# Patient Record
Sex: MALE | Race: WHITE | NOT HISPANIC OR LATINO | Employment: OTHER | URBAN - METROPOLITAN AREA
[De-identification: names, ages, dates, MRNs, and addresses within clinical notes are randomized per-mention and may not be internally consistent; named-entity substitution may affect disease eponyms.]

---

## 2018-03-23 DIAGNOSIS — N18.30 CHRONIC KIDNEY DISEASE, STAGE 3 (CMS/HCC): Primary | ICD-10-CM

## 2018-08-24 ENCOUNTER — OFFICE VISIT (OUTPATIENT)
Dept: NEPHROLOGY | Facility: CLINIC | Age: 73
End: 2018-08-24
Payer: MEDICARE

## 2018-08-24 VITALS
DIASTOLIC BLOOD PRESSURE: 56 MMHG | HEART RATE: 84 BPM | SYSTOLIC BLOOD PRESSURE: 122 MMHG | WEIGHT: 315 LBS | HEIGHT: 72 IN | BODY MASS INDEX: 42.66 KG/M2

## 2018-08-24 DIAGNOSIS — E11.9 CONTROLLED TYPE 2 DIABETES MELLITUS WITHOUT COMPLICATION, WITH LONG-TERM CURRENT USE OF INSULIN (CMS/HCC): ICD-10-CM

## 2018-08-24 DIAGNOSIS — Z79.4 CONTROLLED TYPE 2 DIABETES MELLITUS WITHOUT COMPLICATION, WITH LONG-TERM CURRENT USE OF INSULIN (CMS/HCC): ICD-10-CM

## 2018-08-24 DIAGNOSIS — N18.32 CKD STAGE G3B/A1, GFR 30-44 AND ALBUMIN CREATININE RATIO <30 MG/G (CMS/HCC): Primary | ICD-10-CM

## 2018-08-24 DIAGNOSIS — I10 ESSENTIAL HYPERTENSION: ICD-10-CM

## 2018-08-24 PROBLEM — G62.9 NEUROPATHY: Status: ACTIVE | Noted: 2018-08-24

## 2018-08-24 PROBLEM — N18.31 CKD STAGE G3A/A1, GFR 45-59 AND ALBUMIN CREATININE RATIO <30 MG/G (CMS/HCC): Status: ACTIVE | Noted: 2018-08-24

## 2018-08-24 PROBLEM — Z72.0 TOBACCO USER: Status: RESOLVED | Noted: 2018-08-24 | Resolved: 2018-08-24

## 2018-08-24 PROBLEM — Z72.0 TOBACCO USER: Status: ACTIVE | Noted: 2018-08-24

## 2018-08-24 PROBLEM — R80.9 PROTEINURIA: Status: ACTIVE | Noted: 2018-08-24

## 2018-08-24 PROBLEM — Z90.49 STATUS POST LEFT HEMICOLECTOMY: Status: ACTIVE | Noted: 2018-08-24

## 2018-08-24 PROBLEM — N18.2 CHRONIC KIDNEY DISEASE, STAGE II (MILD): Status: ACTIVE | Noted: 2018-08-24

## 2018-08-24 PROBLEM — T78.3XXA ANGIOEDEMA: Status: RESOLVED | Noted: 2018-08-24 | Resolved: 2018-08-24

## 2018-08-24 PROBLEM — E66.01 SEVERE OBESITY (BMI >= 40) (CMS/HCC): Status: ACTIVE | Noted: 2018-08-24

## 2018-08-24 PROBLEM — E78.5 HYPERLIPIDEMIA: Status: ACTIVE | Noted: 2018-08-24

## 2018-08-24 PROBLEM — T78.3XXA ANGIOEDEMA: Status: ACTIVE | Noted: 2018-08-24

## 2018-08-24 PROBLEM — G47.33 OBSTRUCTIVE SLEEP APNEA TREATED WITH CONTINUOUS POSITIVE AIRWAY PRESSURE (CPAP): Status: ACTIVE | Noted: 2018-08-24

## 2018-08-24 PROBLEM — Z90.49 STATUS POST LEFT HEMICOLECTOMY: Status: RESOLVED | Noted: 2018-08-24 | Resolved: 2018-08-24

## 2018-08-24 PROBLEM — J44.9 CHRONIC OBSTRUCTIVE LUNG DISEASE (CMS/HCC): Status: ACTIVE | Noted: 2018-08-24

## 2018-08-24 PROBLEM — K31.7 GASTRIC POLYP: Status: ACTIVE | Noted: 2018-08-24

## 2018-08-24 PROCEDURE — 99214 OFFICE O/P EST MOD 30 MIN: CPT | Performed by: INTERNAL MEDICINE

## 2018-08-24 PROCEDURE — 99214 OFFICE O/P EST MOD 30 MIN: CPT | Mod: PO | Performed by: INTERNAL MEDICINE

## 2018-08-24 RX ORDER — VALSARTAN 80 MG/1
80 TABLET ORAL 2 TIMES DAILY
COMMUNITY

## 2018-08-24 RX ORDER — MULTIVITAMIN
1 TABLET ORAL DAILY
COMMUNITY
End: 2019-08-02 | Stop reason: SDUPTHER

## 2018-08-24 RX ORDER — FUROSEMIDE 40 MG/1
20 TABLET ORAL 2 TIMES DAILY
COMMUNITY

## 2018-08-24 RX ORDER — FENOFIBRATE 145 MG/1
1 TABLET ORAL DAILY
COMMUNITY
Start: 2018-07-12

## 2018-08-24 RX ORDER — METOPROLOL SUCCINATE 100 MG/1
100 TABLET, EXTENDED RELEASE ORAL DAILY
COMMUNITY

## 2018-08-24 RX ORDER — PIOGLITAZONEHYDROCHLORIDE 15 MG/1
15 TABLET ORAL DAILY
COMMUNITY
Start: 2018-07-12 | End: 2019-08-23

## 2018-08-24 RX ORDER — GABAPENTIN 300 MG/1
300 CAPSULE ORAL 2 TIMES DAILY
COMMUNITY

## 2018-08-24 RX ORDER — EZETIMIBE 10 MG/1
10 TABLET ORAL DAILY
COMMUNITY
End: 2021-06-22 | Stop reason: ALTCHOICE

## 2018-08-24 RX ORDER — AMLODIPINE BESYLATE 10 MG/1
10 TABLET ORAL DAILY
COMMUNITY

## 2018-08-24 RX ORDER — PRAVASTATIN SODIUM 40 MG/1
40 TABLET ORAL DAILY
COMMUNITY
End: 2021-06-22 | Stop reason: ALTCHOICE

## 2018-08-24 RX ORDER — SPIRONOLACTONE 25 MG/1
25 TABLET ORAL 2 TIMES DAILY
COMMUNITY

## 2018-08-24 RX ORDER — PHENOL/SODIUM PHENOLATE
20 AEROSOL, SPRAY (ML) MUCOUS MEMBRANE EVERY EVENING
COMMUNITY
End: 2021-06-22 | Stop reason: SDUPTHER

## 2018-08-24 ASSESSMENT — ENCOUNTER SYMPTOMS
ABDOMINAL PAIN: 0
BACK PAIN: 0
PALPITATIONS: 0
SORE THROAT: 0
EYE PAIN: 0
HEMATURIA: 0
SHORTNESS OF BREATH: 1
CHILLS: 0
VOMITING: 0
COUGH: 0
FEVER: 0
SEIZURES: 0
ARTHRALGIAS: 0
DYSURIA: 0
COLOR CHANGE: 0

## 2018-08-24 ASSESSMENT — PAIN SCALES - GENERAL: PAINLEVEL: 0-NO PAIN

## 2018-08-24 NOTE — LETTER
[facility] CLINIC NEPHROLOGY  [address] [place] [address]  [phone]  Dept: [phone]  August 24, 2018     [facility] [place]  [address]  [place] [address]    Patient: Trenton Pulliam   YOB: 1945   Date of Visit: 8/24/2018       To:  [facility] [place]    Our mutual patient, Trenton Pulliam, was seen in my office on 8/24/2018. Below are my notes.     SANDHYA SANTOS MD  8/24/2018  2:11 PM  Signed  Subjective      Patient ID: Trenton Pulliam is a 73 y.o. male.    HPI  Patient is seen in clinic today for follow-up of his stage III chronic kidney disease with underlying history of diabetes mellitus and hypertension.  He had a remote history of acute kidney injury was necessitated dialysis.  He has been doing reasonably well he denies chest pain has some chronic dyspnea on exertion but no orthopnea or PND he is now on CPAP for obstructive sleep apnea.  He denies any significant lower extremity edema.  He denies any urinary symptoms.  His hemoglobin A1c has improved declined down to 6.9.  He denies any trouble related to his medications.  There has been a slow decline in renal function over the past several years.  There is no nonsteroidal use.  He offers no other complaints.  Past medical history, surgical history, and medications were reviewed in detail with the patient this visit.    Current Outpatient Prescriptions:   •  amLODIPine (NORVASC) 10 mg tablet, Take 10 mg by mouth daily., Disp: , Rfl:   •  aspirin 81 mg EC tablet, Take 81 mg by mouth daily.  , Disp: , Rfl:   •  blood sugar diagnostic (PRECISION XTRA TEST) strip, Use to test blood glucose three times daily, Disp: 300, Rfl: 1  •  empagliflozin (JARDIANCE) 25 mg tablet, Take 25 mg by mouth daily., Disp: , Rfl:   •  empagliflozin 25 mg tablet, Take 1 tablet (25 mg total) by mouth daily., Disp: 90 tablet, Rfl: 3  •  ezetimibe (ZETIA) 10 mg tablet, Take 10 mg by mouth daily., Disp: , Rfl:   •  furosemide (LASIX) 40 mg tablet, Take  20 mg by mouth 2 (two) times a day. , Disp: , Rfl:   •  gabapentin (NEURONTIN) 300 mg capsule, Take 300 mg by mouth 2 (two) times a day., Disp: , Rfl:   •  metoprolol succinate XL (TOPROL-XL) 100 mg 24 hr tablet, Take 100 mg by mouth daily., Disp: , Rfl:   •  multivitamin (THERAGRAN) tablet tablet, Take 1 tablet by mouth daily., Disp: , Rfl:   •  omeprazole (PriLOSEC) 20 mg tablet,delayed release (DR/EC), Take 20 mg by mouth every evening., Disp: , Rfl:   •  pioglitazone (ACTOS) 15 mg tablet, Take 15 mg by mouth daily., Disp: , Rfl:   •  pravastatin (PRAVACHOL) 40 mg tablet, Take 40 mg by mouth daily., Disp: , Rfl:   •  ranitidine (ZANTAC) 150 mg tablet, Take 150 mg by mouth 2 (two) times a day., Disp: , Rfl:   •  sitaGLIPtin-metformin (JANUMET) 50-1,000 mg per tablet, Take 1 tablet by mouth daily., Disp: , Rfl:   •  spironolactone (ALDACTONE) 25 mg tablet, Take 25 mg by mouth 2 (two) times a day., Disp: , Rfl:   •  TRICOR 145 mg tablet, Take 1 tablet by mouth daily., Disp: , Rfl:   •  valsartan (DIOVAN) 80 mg tablet, Take 80 mg by mouth 2 (two) times a day., Disp: , Rfl:   •  empagliflozin (JARDIANCE) 25 mg tablet, TAKE 1 TABLET DAILY, Disp: 90, Rfl: 0  •  gabapentin (NEURONTIN) 300 mg capsule, , Disp: 180, Rfl: 0        Review of Systems   Constitutional: Negative for chills and fever.   HENT: Negative for ear pain and sore throat.    Eyes: Negative for pain and visual disturbance.   Respiratory: Positive for shortness of breath. Negative for cough.    Cardiovascular: Negative for chest pain and palpitations.   Gastrointestinal: Negative for abdominal pain and vomiting.   Genitourinary: Negative for dysuria and hematuria.   Musculoskeletal: Negative for arthralgias and back pain.   Skin: Negative for color change and rash.   Neurological: Negative for seizures and syncope.   All other systems reviewed and are negative.      Objective     Physical Exam   Constitutional: He is oriented to person, place, and time. He  appears well-developed and well-nourished. No distress.   110/64 right arm sitting   Neck: Neck supple. No JVD present.   Cardiovascular: Normal rate, regular rhythm and normal heart sounds.    Pulmonary/Chest: Effort normal and breath sounds normal. No respiratory distress.   Musculoskeletal: Normal range of motion. He exhibits no edema.   Neurological: He is alert and oriented to person, place, and time.   Skin: Skin is warm and dry.   Vitals reviewed.    Abstract on 08/21/2018   Component Date Value Ref Range Status   • External Hemoglobin A1C 07/12/2018 6.9  % Final   • PSA 07/12/2018 2.06  ng/mL Final   • External TSH 07/12/2018 1.14  uIU/ml Final   • Color 07/12/2018 Yellow  Yellow - Yellow Final   • Clarity 07/12/2018 Clear  Clear - Clear Final   • Glucose 07/12/2018   Negative - Negative Final   • Bilirubin 07/12/2018 Negative  Negative - Negative Final   • Ketone 07/12/2018 Negative  Negative - Negative Final   • Specific gravity 07/12/2018 1.013  1.003 - 1.030 Final   • Blood 07/12/2018 Negative  Negative - Negative Final   • pH 07/12/2018 5.0  5.0 - 8.0 PH Final   • Protein 07/12/2018 Negative  Negative - Negative Final   • Urobilinogen 07/12/2018   2.0 EU/dl Final   • Nitrite 07/12/2018 Negative  Negative - Negative Final   • Leukocyte esterase 07/12/2018 Trace  Negative - Negative Final   • WBC Clumps, Urine 07/12/2018   None seen /HPF Final   • RBC Casts, Urine 07/12/2018   None seen /LPF Final   • URINE BACTERIA 07/12/2018 none   Final   • URINE SQUAMOUS EPITHELIAL CELL 07/12/2018 none   Final   Orders Only on 08/21/2018   Component Date Value Ref Range Status   • Triglycerides 07/12/2018 217  mg/dL Final   • Cholesterol 07/12/2018 131  mg/dL Final   • HDL 07/12/2018 39  mg/dL Final   • LDL Calculated 07/12/2018 48.6  mg/dL Final   • AST 07/12/2018 23  U/L Final   • ALT (SGPT) 07/12/2018 34  U/L Final   Orders Only on 08/21/2018   Component Date Value Ref Range Status   • Magnesium 07/12/2018 2.5   mg/dL Final   • Creatinine, Ur 07/12/2018 63  mg/dL Final   • Albumin, Urine 07/12/2018 0.7  mg/L Final   • ALBUMIN/CREATININE RATIO 07/12/2018 11.1   Final   • Albumin 07/12/2018 4.4  g/dL Final   • Calcium 07/12/2018 9.7  mg/dL Final   • Phosphorus 07/12/2018 4.2  mg/dL Final   • Glucose 07/12/2018 158  mg/dL Final   • BUN 07/12/2018 30  mg/dL Final   • Creatinine 07/12/2018 2.14  mg/dL Final   • Sodium 07/12/2018 145  mmol/L Final   • Potassium 07/12/2018 4.5  mmol/L Final   • Chloride 07/12/2018 103  mmol/L Final   • CO2 07/12/2018 28  mmol/L Final   • eGFR 07/12/2018 30.5  mL/min/1.73m*2 Final   • External WBC 07/12/2018 8.5  10*3/mL Final   • External RBC 07/12/2018 5.06  10*6/µL Final   • External Hemoglobin 07/12/2018 14.6  g/dL Final   • External Hematocrit 07/12/2018 45.2  % Final   • External MCV 07/12/2018 89.3  fL Final   • External MCH 07/12/2018 28.9  pg Final   • External MCHC 07/12/2018 32.3  g/dL Final   • EXTERNAL RDW-CV 07/12/2018 14.0  % Final   • External Platelets 07/12/2018 264  10*3/uL Final   • External MPV 07/12/2018 9.7  fL Final   • External Monocytes Absolute 07/12/2018 0.7  10*3/uL Final   • External Eosinophils Absolute 07/12/2018 0.5  10*3/uL Final   • External Basophils Absolute 07/12/2018 0.1  10*3/uL Final   • External Neutrophils% 07/12/2018 61.6  % Final   • External Eosinophils% 07/12/2018 5.7  % Final   • External Basophils% 07/12/2018 1.1  % Final   • Lymphocytes% 07/12/2018 22.9  % Final   • Monocytes% 07/12/2018 8.1  % Final   • Neutrophils Absolute 07/12/2018 5.2   Final   • External Lymphocytes Absolute 07/12/2018 1.9  10*3/uL Final   • PTH, Intact 07/12/2018 42.9  pg/mL Final         Assessment/Plan       Diagnosis Plan   1. CKD stage G3b/A1, GFR 30-44 and albumin creatinine ratio <30 mg/g     2. Controlled type 2 diabetes mellitus without complication, with long-term current use of insulin (CMS/[facility])     3. Essential hypertension         There is been a slow decline in  renal function over the past several years.  This can be seen as residual from acute kidney injury.  Blood pressures under adequate control.  Volume status is left and electrolytes are acceptable.  If the kidney function continues to deteriorate he may need to discontinue his Janumet in the near future.  No other adjustments are needed his medications.  24 hour urine collection is pending.               If you have questions, please do not hesitate to call me. I look forward to following your patient along with you.         Sincerely,        SANDHYA SANTOS MD        CC: No Recipients

## 2018-08-24 NOTE — PROGRESS NOTES
Subjective      Patient ID: Trenton Pulliam is a 73 y.o. male.    HPI  Patient is seen in clinic today for follow-up of his stage III chronic kidney disease with underlying history of diabetes mellitus and hypertension.  He had a remote history of acute kidney injury was necessitated dialysis.  He has been doing reasonably well he denies chest pain has some chronic dyspnea on exertion but no orthopnea or PND he is now on CPAP for obstructive sleep apnea.  He denies any significant lower extremity edema.  He denies any urinary symptoms.  His hemoglobin A1c has improved declined down to 6.9.  He denies any trouble related to his medications.  There has been a slow decline in renal function over the past several years.  There is no nonsteroidal use.  He offers no other complaints.  Past medical history, surgical history, and medications were reviewed in detail with the patient this visit.    Current Outpatient Prescriptions:   •  amLODIPine (NORVASC) 10 mg tablet, Take 10 mg by mouth daily., Disp: , Rfl:   •  aspirin 81 mg EC tablet, Take 81 mg by mouth daily.  , Disp: , Rfl:   •  blood sugar diagnostic (PRECISION XTRA TEST) strip, Use to test blood glucose three times daily, Disp: 300, Rfl: 1  •  empagliflozin (JARDIANCE) 25 mg tablet, Take 25 mg by mouth daily., Disp: , Rfl:   •  empagliflozin 25 mg tablet, Take 1 tablet (25 mg total) by mouth daily., Disp: 90 tablet, Rfl: 3  •  ezetimibe (ZETIA) 10 mg tablet, Take 10 mg by mouth daily., Disp: , Rfl:   •  furosemide (LASIX) 40 mg tablet, Take 20 mg by mouth 2 (two) times a day. , Disp: , Rfl:   •  gabapentin (NEURONTIN) 300 mg capsule, Take 300 mg by mouth 2 (two) times a day., Disp: , Rfl:   •  metoprolol succinate XL (TOPROL-XL) 100 mg 24 hr tablet, Take 100 mg by mouth daily., Disp: , Rfl:   •  multivitamin (THERAGRAN) tablet tablet, Take 1 tablet by mouth daily., Disp: , Rfl:   •  omeprazole (PriLOSEC) 20 mg tablet,delayed release (DR/EC), Take 20 mg by mouth every  evening., Disp: , Rfl:   •  pioglitazone (ACTOS) 15 mg tablet, Take 15 mg by mouth daily., Disp: , Rfl:   •  pravastatin (PRAVACHOL) 40 mg tablet, Take 40 mg by mouth daily., Disp: , Rfl:   •  ranitidine (ZANTAC) 150 mg tablet, Take 150 mg by mouth 2 (two) times a day., Disp: , Rfl:   •  sitaGLIPtin-metformin (JANUMET) 50-1,000 mg per tablet, Take 1 tablet by mouth daily., Disp: , Rfl:   •  spironolactone (ALDACTONE) 25 mg tablet, Take 25 mg by mouth 2 (two) times a day., Disp: , Rfl:   •  TRICOR 145 mg tablet, Take 1 tablet by mouth daily., Disp: , Rfl:   •  valsartan (DIOVAN) 80 mg tablet, Take 80 mg by mouth 2 (two) times a day., Disp: , Rfl:   •  empagliflozin (JARDIANCE) 25 mg tablet, TAKE 1 TABLET DAILY, Disp: 90, Rfl: 0  •  gabapentin (NEURONTIN) 300 mg capsule, , Disp: 180, Rfl: 0        Review of Systems   Constitutional: Negative for chills and fever.   HENT: Negative for ear pain and sore throat.    Eyes: Negative for pain and visual disturbance.   Respiratory: Positive for shortness of breath. Negative for cough.    Cardiovascular: Negative for chest pain and palpitations.   Gastrointestinal: Negative for abdominal pain and vomiting.   Genitourinary: Negative for dysuria and hematuria.   Musculoskeletal: Negative for arthralgias and back pain.   Skin: Negative for color change and rash.   Neurological: Negative for seizures and syncope.   All other systems reviewed and are negative.      Objective     Physical Exam   Constitutional: He is oriented to person, place, and time. He appears well-developed and well-nourished. No distress.   110/64 right arm sitting   Neck: Neck supple. No JVD present.   Cardiovascular: Normal rate, regular rhythm and normal heart sounds.    Pulmonary/Chest: Effort normal and breath sounds normal. No respiratory distress.   Musculoskeletal: Normal range of motion. He exhibits no edema.   Neurological: He is alert and oriented to person, place, and time.   Skin: Skin is warm and  dry.   Vitals reviewed.    Abstract on 08/21/2018   Component Date Value Ref Range Status   • External Hemoglobin A1C 07/12/2018 6.9  % Final   • PSA 07/12/2018 2.06  ng/mL Final   • External TSH 07/12/2018 1.14  uIU/ml Final   • Color 07/12/2018 Yellow  Yellow - Yellow Final   • Clarity 07/12/2018 Clear  Clear - Clear Final   • Glucose 07/12/2018   Negative - Negative Final   • Bilirubin 07/12/2018 Negative  Negative - Negative Final   • Ketone 07/12/2018 Negative  Negative - Negative Final   • Specific gravity 07/12/2018 1.013  1.003 - 1.030 Final   • Blood 07/12/2018 Negative  Negative - Negative Final   • pH 07/12/2018 5.0  5.0 - 8.0 PH Final   • Protein 07/12/2018 Negative  Negative - Negative Final   • Urobilinogen 07/12/2018   2.0 EU/dl Final   • Nitrite 07/12/2018 Negative  Negative - Negative Final   • Leukocyte esterase 07/12/2018 Trace  Negative - Negative Final   • WBC Clumps, Urine 07/12/2018   None seen /HPF Final   • RBC Casts, Urine 07/12/2018   None seen /LPF Final   • URINE BACTERIA 07/12/2018 none   Final   • URINE SQUAMOUS EPITHELIAL CELL 07/12/2018 none   Final   Orders Only on 08/21/2018   Component Date Value Ref Range Status   • Triglycerides 07/12/2018 217  mg/dL Final   • Cholesterol 07/12/2018 131  mg/dL Final   • HDL 07/12/2018 39  mg/dL Final   • LDL Calculated 07/12/2018 48.6  mg/dL Final   • AST 07/12/2018 23  U/L Final   • ALT (SGPT) 07/12/2018 34  U/L Final   Orders Only on 08/21/2018   Component Date Value Ref Range Status   • Magnesium 07/12/2018 2.5  mg/dL Final   • Creatinine, Ur 07/12/2018 63  mg/dL Final   • Albumin, Urine 07/12/2018 0.7  mg/L Final   • ALBUMIN/CREATININE RATIO 07/12/2018 11.1   Final   • Albumin 07/12/2018 4.4  g/dL Final   • Calcium 07/12/2018 9.7  mg/dL Final   • Phosphorus 07/12/2018 4.2  mg/dL Final   • Glucose 07/12/2018 158  mg/dL Final   • BUN 07/12/2018 30  mg/dL Final   • Creatinine 07/12/2018 2.14  mg/dL Final   • Sodium 07/12/2018 145  mmol/L Final    • Potassium 07/12/2018 4.5  mmol/L Final   • Chloride 07/12/2018 103  mmol/L Final   • CO2 07/12/2018 28  mmol/L Final   • eGFR 07/12/2018 30.5  mL/min/1.73m*2 Final   • External WBC 07/12/2018 8.5  10*3/mL Final   • External RBC 07/12/2018 5.06  10*6/µL Final   • External Hemoglobin 07/12/2018 14.6  g/dL Final   • External Hematocrit 07/12/2018 45.2  % Final   • External MCV 07/12/2018 89.3  fL Final   • External MCH 07/12/2018 28.9  pg Final   • External MCHC 07/12/2018 32.3  g/dL Final   • EXTERNAL RDW-CV 07/12/2018 14.0  % Final   • External Platelets 07/12/2018 264  10*3/uL Final   • External MPV 07/12/2018 9.7  fL Final   • External Monocytes Absolute 07/12/2018 0.7  10*3/uL Final   • External Eosinophils Absolute 07/12/2018 0.5  10*3/uL Final   • External Basophils Absolute 07/12/2018 0.1  10*3/uL Final   • External Neutrophils% 07/12/2018 61.6  % Final   • External Eosinophils% 07/12/2018 5.7  % Final   • External Basophils% 07/12/2018 1.1  % Final   • Lymphocytes% 07/12/2018 22.9  % Final   • Monocytes% 07/12/2018 8.1  % Final   • Neutrophils Absolute 07/12/2018 5.2   Final   • External Lymphocytes Absolute 07/12/2018 1.9  10*3/uL Final   • PTH, Intact 07/12/2018 42.9  pg/mL Final         Assessment/Plan       Diagnosis Plan   1. CKD stage G3b/A1, GFR 30-44 and albumin creatinine ratio <30 mg/g     2. Controlled type 2 diabetes mellitus without complication, with long-term current use of insulin (CMS/[facility])     3. Essential hypertension         There is been a slow decline in renal function over the past several years.  This can be seen as residual from acute kidney injury.  Blood pressures under adequate control.  Volume status is left and electrolytes are acceptable.  If the kidney function continues to deteriorate he may need to discontinue his Janumet in the near future.  No other adjustments are needed his medications.  24 hour urine collection is pending.

## 2018-08-27 NOTE — PROGRESS NOTES
PROGRESS NOTE     Subjective   Trenton Pulliam is a 73 y.o. male with type 2 diabetes, which he has had for about 20 years. His past medical history is also significant for hyperlipidemia, hypertension & CKD stage 3b, he uses coap and oxygen at night. A1c  Is 6.9%Currently taking Janumet 50/1000mg twice daily, Jardiance 25mg daily & Actos 15mg daily for diabetes management. He is trying any injectable meds.     Trenton checks blood sugars 1 times daily. Fasting readings 90s-140's in am. Denies any hypoglycemia since his last visit, stating the lowest his blood sugar gets is in the 90. [facility] . [facility]. He is limited by exercise - he says he get some sob and pain in his hip with pain.     Labs from 7/12/2018 are as follows:  HbA1c 6.9%, Urine albumin/creatinine ratio 11.2, PTH 42.9, BUN 30, creatinine 2.14, GFR 30.5,, , HDL 39 and LDL 48.6 - pravachol 40 mg and zetia 10 mg.     BP is 98/64 -early on amlodipine 10 mg, Spironactone 25 mg, valsartan 80 mg, Toprol 100 mg of note is on jardiance 25 mg. He is on lasix 40 mg.  No lightheadness no dizziness typically - very rarely will get lightheaded.     Currently Taking Medications:  Outpatient Prescriptions Marked as Taking for the 8/28/18 encounter (Office Visit) with Leah Venegas MD   Medication Indication(s) Sig   • amLODIPine (NORVASC) 10 mg tablet   Take 10 mg by mouth daily.   • aspirin 81 mg EC tablet   Take 81 mg by mouth daily.     • blood sugar diagnostic (PRECISION XTRA TEST) strip   Use to test blood glucose three times daily   • diphenhydramine HCl (SIMPLY SLEEP ORAL)   Take by mouth daily.   • empagliflozin (JARDIANCE) 25 mg tablet   TAKE 1 TABLET DAILY   • ezetimibe (ZETIA) 10 mg tablet   Take 10 mg by mouth daily.   • furosemide (LASIX) 40 mg tablet   Take 20 mg by mouth 2 (two) times a day.    • gabapentin (NEURONTIN) 300 mg capsule   Take 300 mg  by mouth 2 (two) times a day.   • Lactobacillus rhamnosus GG (CULTURELLE) 15 billion cell capsule, sprinkle   Take by mouth daily.   • metoprolol succinate XL (TOPROL-XL) 100 mg 24 hr tablet   Take 100 mg by mouth daily.   • multivitamin (THERAGRAN) tablet tablet   Take 1 tablet by mouth daily.   • omega 3-dha-epa-fish oil 250-500-1,000 mg capsule   Take by mouth 2 (two) times a day.   • omeprazole (PriLOSEC) 20 mg tablet,delayed release (DR/EC)   Take 20 mg by mouth every evening.   • pioglitazone (ACTOS) 15 mg tablet   Take 15 mg by mouth daily.   • pravastatin (PRAVACHOL) 40 mg tablet   Take 40 mg by mouth daily.   • psyllium husk (METAMUCIL ORAL)   Take by mouth daily.   • ranitidine (ZANTAC) 150 mg tablet   Take 150 mg by mouth 2 (two) times a day.   • sitaGLIPtin-metformin (JANUMET) 50-1,000 mg per tablet   Take 1 tablet by mouth daily.   • spironolactone (ALDACTONE) 25 mg tablet   Take 25 mg by mouth 2 (two) times a day.   • TRICOR 145 mg tablet   Take 1 tablet by mouth daily.   • valsartan (DIOVAN) 80 mg tablet   Take 80 mg by mouth 2 (two) times a day.   • [DISCONTINUED] empagliflozin 25 mg tablet   Take 1 tablet (25 mg total) by mouth daily.       Allergies:  Allergies   Allergen Reactions   • Ace Inhibitors      POSSIBLE ANGIOEDEMA   • Azithromycin Anaphylaxis     QUESTIONABLE ALLERGY   • Lisinopril      Other reaction(s): Cough  ANGIOEDEMA   • Nitroglycerin Anaphylaxis     SEIZURES       History Review:  No past medical history on file.    No past surgical history on file.    Social History     Social History   • Marital status:      Spouse name: N/A   • Number of children: N/A   • Years of education: N/A     Occupational History   • Not on file.     Social History Main Topics   • Smoking status: Former Smoker   • Smokeless tobacco: Never Used   • Alcohol use Not on file   • Drug use: Unknown   • Sexual activity: Not on file     Other Topics Concern   • Not on file     Social History Narrative   •  No narrative on file       No family history on file.    Review of Systems   Constitutional: Negative for appetite change, chills, fatigue, fever and unexpected weight change.        +cold     HENT: Negative for ear pain, hearing loss, sore throat, trouble swallowing and voice change.    Eyes: Negative for pain, redness and visual disturbance.   Respiratory: Negative for cough, choking, chest tightness, shortness of breath and wheezing.    Cardiovascular: Negative for chest pain and palpitations.   Gastrointestinal: Negative for abdominal distention, abdominal pain, blood in stool, constipation, diarrhea and vomiting.   Endocrine: Negative for cold intolerance, heat intolerance, polydipsia, polyphagia and polyuria.   Genitourinary: Negative for difficulty urinating, dysuria, flank pain, frequency, hematuria and urgency.   Musculoskeletal: Negative for arthralgias, back pain, gait problem, joint swelling, myalgias and neck pain.   Skin: Negative for rash and wound.   Neurological: Negative for dizziness, syncope, weakness, numbness and headaches.   Hematological: Does not bruise/bleed easily.   Psychiatric/Behavioral: Negative for behavioral problems, decreased concentration, dysphoric mood and sleep disturbance. The patient is not nervous/anxious.          Objective   BP 98/64 (BP Location: Left arm, Patient Position: Sitting, Cuff Size: Large)   Pulse 76   Ht 1.829 m (6')   Wt (!) 148 kg (325 lb 3.2 oz)   SpO2 92%   BMI 44.11 kg/m²       Physical Exam  GENERAL:  Alert and oriented x3, in no apparent distress. Mask in place  HEENT:  Normocephalic, atraumatic.  There is no lid lag or proptosis noted.  Sclerae are not injected.  NECK:  Thyroid is smooth, nontender, not enlarged.  No nodularities appreciated.  No bruits auscultated.  CARDIOVASCULAR:  Regular rate and rhythm.  S1, S2 are heard.  No rubs, gallops or murmurs are appreciated.  RESPIRATORY:  Lungs clear to auscultation bilaterally without rales,  rhonchi or wheezing.  EXTREMITIES:  Show no clubbing, cyanosis, edema, or tremor.  NEUROLOGIC:  There is no focal deficit.  Gait is normal. DTR 1+   PSYCHIATRIC:  The patient is alert and oriented.  Mood and affect are appropriate.  SKIN:  Without significant rash or lesion.    Lab Review:  No results found for: HGBA1C  Lab Results   Component Value Date    CHOL 131 07/12/2018     Lab Results   Component Value Date    HDL 39 07/12/2018     Lab Results   Component Value Date    LDLCALC 48.6 07/12/2018     Lab Results   Component Value Date    TRIG 217 07/12/2018     Lab Results   Component Value Date    MICROALBUR 0.7 07/12/2018     No results found for: TSH  Lab Results   Component Value Date    GLUCOSE 158 07/12/2018    CALCIUM 9.7 07/12/2018     07/12/2018    K 4.5 07/12/2018    CO2 28 07/12/2018     07/12/2018    BUN 30 07/12/2018    CREATININE 2.14 07/12/2018    ANIONGAP 11.0 08/25/2016           Assessment/Plan   Diagnoses and all orders for this visit:    Controlled type 2 diabetes mellitus without complication, with long-term current use of insulin (CMS/[facility])    Essential hypertension    Mixed hyperlipidemia    CKD stage G3b/A1, GFR 30-44 and albumin creatinine ratio <30 mg/g      1.  Type 2 diabetes without complications: His most recent A1c 6.9%.  He is currently on Janumet 50/1000mg twice daily, Jardiance 25mg daily & Actos 15mg.  The patient was encouraged to check a few fingerstick blood glucose readings later in the day so we can see how his numbers span.  He is adamant about not using any injectable medicines.  Patient was encouraged to continue work on diet and exercise modification.    It turns out that his wife was recently diagnosed with diabetes she is diet controlled.  This is helping them to both work on their eating habits.  Shins, he has no retinopathy no nephropathy and no neuropathy.  The patient does see nephrology and his chronic kidney disease stage IIIb.  Is up-to-date with  his foot exam he continues to follow with podiatry.      2.  Hypertension: he is on multiple agents.  His most recent BUN/creatinine is slightly elevated at 30 and 2.4.  His GFR is 30.  He is on numerous agents including many diuretics.  He does see Dr. Vann tomorrow for cardiology.  My recommendation would be to potentially stop the spironolactone if at all possible from a cardiology standpoint.  He 640 mg, guardians with serves as a diuretic.      3.  Mixed hyperlipidemia: His LDL is at goal less than 100.  He is on Pravachol 40 mg, Zetia 10 mg.    Time spent 30 minutes of which greater than 50% of face-to-face time was spent discussing counseling coordination of care regarding type 2 diabetes controlled without complications, hypertension, mixed dyslipidemia.      A voice recognition program was used to aid in documentation of this record. Sometimes words are not printed exactly as they were spoken.  While efforts were made to carefully edit and correct any inaccuracies, some errors may be present; please take these into context.  Please contact the provider if areas are identified            No Follow-up on file.

## 2018-08-28 ENCOUNTER — OFFICE VISIT (OUTPATIENT)
Dept: ENDOCRINOLOGY | Facility: CLINIC | Age: 73
End: 2018-08-28
Payer: MEDICARE

## 2018-08-28 VITALS
WEIGHT: 315 LBS | HEART RATE: 76 BPM | OXYGEN SATURATION: 92 % | DIASTOLIC BLOOD PRESSURE: 64 MMHG | SYSTOLIC BLOOD PRESSURE: 98 MMHG | BODY MASS INDEX: 42.66 KG/M2 | HEIGHT: 72 IN

## 2018-08-28 DIAGNOSIS — N18.32 CKD STAGE G3B/A1, GFR 30-44 AND ALBUMIN CREATININE RATIO <30 MG/G (CMS/HCC): ICD-10-CM

## 2018-08-28 DIAGNOSIS — Z79.4 CONTROLLED TYPE 2 DIABETES MELLITUS WITHOUT COMPLICATION, WITH LONG-TERM CURRENT USE OF INSULIN (CMS/HCC): Primary | ICD-10-CM

## 2018-08-28 DIAGNOSIS — I10 ESSENTIAL HYPERTENSION: ICD-10-CM

## 2018-08-28 DIAGNOSIS — E11.9 CONTROLLED TYPE 2 DIABETES MELLITUS WITHOUT COMPLICATION, WITH LONG-TERM CURRENT USE OF INSULIN (CMS/HCC): Primary | ICD-10-CM

## 2018-08-28 DIAGNOSIS — E78.2 MIXED HYPERLIPIDEMIA: ICD-10-CM

## 2018-08-28 PROCEDURE — 99214 OFFICE O/P EST MOD 30 MIN: CPT | Performed by: INTERNAL MEDICINE

## 2018-08-28 PROCEDURE — 99214 OFFICE O/P EST MOD 30 MIN: CPT | Mod: PO | Performed by: INTERNAL MEDICINE

## 2018-08-28 RX ORDER — CEPHRADINE 250 MG
CAPSULE ORAL 2 TIMES DAILY
COMMUNITY
End: 2019-08-02 | Stop reason: ALTCHOICE

## 2018-08-28 ASSESSMENT — ENCOUNTER SYMPTOMS
WHEEZING: 0
CHOKING: 0
APPETITE CHANGE: 0
ABDOMINAL DISTENTION: 0
ABDOMINAL PAIN: 0
CONSTIPATION: 0
POLYPHAGIA: 0
CHEST TIGHTNESS: 0
BLOOD IN STOOL: 0
EYE REDNESS: 0
TROUBLE SWALLOWING: 0
UNEXPECTED WEIGHT CHANGE: 0
NERVOUS/ANXIOUS: 0
FLANK PAIN: 0
HEMATURIA: 0
SORE THROAT: 0
ARTHRALGIAS: 0
DYSURIA: 0
FREQUENCY: 0
NECK PAIN: 0
BACK PAIN: 0
DECREASED CONCENTRATION: 0
WOUND: 0
CHILLS: 0
BRUISES/BLEEDS EASILY: 0
DIZZINESS: 0
VOMITING: 0
PALPITATIONS: 0
FEVER: 0
DIFFICULTY URINATING: 0
DYSPHORIC MOOD: 0
MYALGIAS: 0
WEAKNESS: 0
NUMBNESS: 0
SHORTNESS OF BREATH: 0
VOICE CHANGE: 0
DIARRHEA: 0
SLEEP DISTURBANCE: 0
FATIGUE: 0
JOINT SWELLING: 0
HEADACHES: 0
COUGH: 0
EYE PAIN: 0
POLYDIPSIA: 0

## 2018-08-28 NOTE — LETTER
[facility]  [address] [facility] [place] [id number] [phone]  Dept: [phone]  August 28, 2018     AURELIANO Matthews  [address]  [place] [address]    Patient: Trenton Pulliam   YOB: 1945   Date of Visit: 8/28/2018       To:  AURELIANO Matthews    Our mutual patient, Trenton Pulliam, was seen in my office on 8/28/2018. Below are my notes.     KOBI VALDOVINOS MD  8/28/2018  2:38 PM  Signed  PROGRESS NOTE     Subjective   Trenton Pulliam is a 73 y.o. male with type 2 diabetes, which he has had for about 20 years. His past medical history is also significant for hyperlipidemia, hypertension & CKD stage 3b, he uses coap and oxygen at night. A1c  Is 6.9%Currently taking Janumet 50/1000mg twice daily, Jardiance 25mg daily & Actos 15mg daily for diabetes management. He is trying any injectable meds.     Trenton checks blood sugars 1 times daily. Fasting readings 90s-140's in am. Denies any hypoglycemia since his last visit, stating the lowest his blood sugar gets is in the 90. [facility]. [facility]. He is limited by exercise - he says he get some sob and pain in his hip with pain.     Labs from 7/12/2018 are as follows:  HbA1c 6.9%, Urine albumin/creatinine ratio 11.2, PTH 42.9, BUN 30, creatinine 2.14, GFR 30.5,, , HDL 39 and LDL 48.6 - pravachol 40 mg and zetia 10 mg.     BP is 98/64 -early on amlodipine 10 mg, Spironactone 25 mg, valsartan 80 mg, Toprol 100 mg of note is on jardiance 25 mg. He is on lasix 40 mg.  No lightheadness no dizziness typically - very rarely will get lightheaded.     Currently Taking Medications:  Outpatient Prescriptions Marked as Taking for the 8/28/18 encounter (Office Visit) with Kobi Valdovinos MD   Medication Indication(s) Sig   • amLODIPine (NORVASC) 10 mg tablet   Take 10 mg by mouth daily.   • aspirin 81 mg EC tablet   Take 81 mg by  mouth daily.     • blood sugar diagnostic (PRECISION XTRA TEST) strip   Use to test blood glucose three times daily   • diphenhydramine HCl (SIMPLY SLEEP ORAL)   Take by mouth daily.   • empagliflozin (JARDIANCE) 25 mg tablet   TAKE 1 TABLET DAILY   • ezetimibe (ZETIA) 10 mg tablet   Take 10 mg by mouth daily.   • furosemide (LASIX) 40 mg tablet   Take 20 mg by mouth 2 (two) times a day.    • gabapentin (NEURONTIN) 300 mg capsule   Take 300 mg by mouth 2 (two) times a day.   • Lactobacillus rhamnosus GG (CULTURELLE) 15 billion cell capsule, sprinkle   Take by mouth daily.   • metoprolol succinate XL (TOPROL-XL) 100 mg 24 hr tablet   Take 100 mg by mouth daily.   • multivitamin (THERAGRAN) tablet tablet   Take 1 tablet by mouth daily.   • omega 3-dha-epa-fish oil 250-500-1,000 mg capsule   Take by mouth 2 (two) times a day.   • omeprazole (PriLOSEC) 20 mg tablet,delayed release (DR/EC)   Take 20 mg by mouth every evening.   • pioglitazone (ACTOS) 15 mg tablet   Take 15 mg by mouth daily.   • pravastatin (PRAVACHOL) 40 mg tablet   Take 40 mg by mouth daily.   • psyllium husk (METAMUCIL ORAL)   Take by mouth daily.   • ranitidine (ZANTAC) 150 mg tablet   Take 150 mg by mouth 2 (two) times a day.   • sitaGLIPtin-metformin (JANUMET) 50-1,000 mg per tablet   Take 1 tablet by mouth daily.   • spironolactone (ALDACTONE) 25 mg tablet   Take 25 mg by mouth 2 (two) times a day.   • TRICOR 145 mg tablet   Take 1 tablet by mouth daily.   • valsartan (DIOVAN) 80 mg tablet   Take 80 mg by mouth 2 (two) times a day.   • [DISCONTINUED] empagliflozin 25 mg tablet   Take 1 tablet (25 mg total) by mouth daily.       Allergies:  Allergies   Allergen Reactions   • Ace Inhibitors      POSSIBLE ANGIOEDEMA   • Azithromycin Anaphylaxis     QUESTIONABLE ALLERGY   • Lisinopril      Other reaction(s): Cough  ANGIOEDEMA   • Nitroglycerin Anaphylaxis     SEIZURES       History Review:  No past medical history on file.    No past surgical history  on file.    Social History     Social History   • Marital status:      Spouse name: N/A   • Number of children: N/A   • Years of education: N/A     Occupational History   • Not on file.     Social History Main Topics   • Smoking status: Former Smoker   • Smokeless tobacco: Never Used   • Alcohol use Not on file   • Drug use: Unknown   • Sexual activity: Not on file     Other Topics Concern   • Not on file     Social History Narrative   • No narrative on file       No family history on file.    Review of Systems   Constitutional: Negative for appetite change, chills, fatigue, fever and unexpected weight change.        +cold     HENT: Negative for ear pain, hearing loss, sore throat, trouble swallowing and voice change.    Eyes: Negative for pain, redness and visual disturbance.   Respiratory: Negative for cough, choking, chest tightness, shortness of breath and wheezing.    Cardiovascular: Negative for chest pain and palpitations.   Gastrointestinal: Negative for abdominal distention, abdominal pain, blood in stool, constipation, diarrhea and vomiting.   Endocrine: Negative for cold intolerance, heat intolerance, polydipsia, polyphagia and polyuria.   Genitourinary: Negative for difficulty urinating, dysuria, flank pain, frequency, hematuria and urgency.   Musculoskeletal: Negative for arthralgias, back pain, gait problem, joint swelling, myalgias and neck pain.   Skin: Negative for rash and wound.   Neurological: Negative for dizziness, syncope, weakness, numbness and headaches.   Hematological: Does not bruise/bleed easily.   Psychiatric/Behavioral: Negative for behavioral problems, decreased concentration, dysphoric mood and sleep disturbance. The patient is not nervous/anxious.          Objective   BP 98/64 (BP Location: Left arm, Patient Position: Sitting, Cuff Size: Large)   Pulse 76   Ht 1.829 m (6')   Wt (!) 148 kg (325 lb 3.2 oz)   SpO2 92%   BMI 44.11 kg/m²        Physical Exam  GENERAL:  Alert  and oriented x3, in no apparent distress. Mask in place  HEENT:  Normocephalic, atraumatic.  There is no lid lag or proptosis noted.  Sclerae are not injected.  NECK:  Thyroid is smooth, nontender, not enlarged.  No nodularities appreciated.  No bruits auscultated.  CARDIOVASCULAR:  Regular rate and rhythm.  S1, S2 are heard.  No rubs, gallops or murmurs are appreciated.  RESPIRATORY:  Lungs clear to auscultation bilaterally without rales, rhonchi or wheezing.  EXTREMITIES:  Show no clubbing, cyanosis, edema, or tremor.  NEUROLOGIC:  There is no focal deficit.  Gait is normal. DTR 1+   PSYCHIATRIC:  The patient is alert and oriented.  Mood and affect are appropriate.  SKIN:  Without significant rash or lesion.    Lab Review:  No results found for: HGBA1C  Lab Results   Component Value Date    CHOL 131 07/12/2018     Lab Results   Component Value Date    HDL 39 07/12/2018     Lab Results   Component Value Date    LDLCALC 48.6 07/12/2018     Lab Results   Component Value Date    TRIG 217 07/12/2018     Lab Results   Component Value Date    MICROALBUR 0.7 07/12/2018     No results found for: TSH  Lab Results   Component Value Date    GLUCOSE 158 07/12/2018    CALCIUM 9.7 07/12/2018     07/12/2018    K 4.5 07/12/2018    CO2 28 07/12/2018     07/12/2018    BUN 30 07/12/2018    CREATININE 2.14 07/12/2018    ANIONGAP 11.0 08/25/2016           Assessment/Plan   Diagnoses and all orders for this visit:    Controlled type 2 diabetes mellitus without complication, with long-term current use of insulin (CMS/[facility])    Essential hypertension    Mixed hyperlipidemia    CKD stage G3b/A1, GFR 30-44 and albumin creatinine ratio <30 mg/g      1.  Type 2 diabetes without complications: His most recent A1c 6.9%.  He is currently on Janumet 50/1000mg twice daily, Jardiance 25mg daily & Actos 15mg.  The patient was encouraged to check a few fingerstick blood glucose readings later in the day so we can see how his numbers span.   He is adamant about not using any injectable medicines.  Patient was encouraged to continue work on diet and exercise modification.    It turns out that his wife was recently diagnosed with diabetes she is diet controlled.  This is helping them to both work on their eating habits.  Shins, he has no retinopathy no nephropathy and no neuropathy.  The patient does see nephrology and his chronic kidney disease stage IIIb.  Is up-to-date with his foot exam he continues to follow with podiatry.      2.  Hypertension: he is on multiple agents.  His most recent BUN/creatinine is slightly elevated at 30 and 2.4.  His GFR is 30.  He is on numerous agents including many diuretics.  He does see Dr. Vann tomorrow for cardiology.  My recommendation would be to potentially stop the spironolactone if at all possible from a cardiology standpoint.  He 640 mg, guardians with serves as a diuretic.      3.  Mixed hyperlipidemia: His LDL is at goal less than 100.  He is on Pravachol 40 mg, Zetia 10 mg.    Time spent 30 minutes of which greater than 50% of face-to-face time was spent discussing counseling coordination of care regarding type 2 diabetes controlled without complications, hypertension, mixed dyslipidemia.      A voice recognition program was used to aid in documentation of this record. Sometimes words are not printed exactly as they were spoken.  While efforts were made to carefully edit and correct any inaccuracies, some errors may be present; please take these into context.  Please contact the provider if areas are identified            No Follow-up on file.               If you have questions, please do not hesitate to call me. I look forward to following your patient along with you.         Sincerely,        KOBI VALDOVINOS MD        CC: No Recipients

## 2018-08-29 ENCOUNTER — OFFICE VISIT (OUTPATIENT)
Dept: CARDIOLOGY | Facility: CLINIC | Age: 73
End: 2018-08-29
Payer: MEDICARE

## 2018-08-29 VITALS
SYSTOLIC BLOOD PRESSURE: 138 MMHG | RESPIRATION RATE: 22 BRPM | BODY MASS INDEX: 42.66 KG/M2 | HEIGHT: 72 IN | OXYGEN SATURATION: 94 % | HEART RATE: 77 BPM | WEIGHT: 315 LBS | DIASTOLIC BLOOD PRESSURE: 72 MMHG

## 2018-08-29 DIAGNOSIS — E78.2 MIXED HYPERLIPIDEMIA: ICD-10-CM

## 2018-08-29 DIAGNOSIS — I10 ESSENTIAL HYPERTENSION: ICD-10-CM

## 2018-08-29 DIAGNOSIS — J41.0 SIMPLE CHRONIC BRONCHITIS (CMS/HCC): ICD-10-CM

## 2018-08-29 DIAGNOSIS — G62.9 NEUROPATHY: ICD-10-CM

## 2018-08-29 DIAGNOSIS — I25.10 CORONARY ARTERY DISEASE INVOLVING NATIVE CORONARY ARTERY OF NATIVE HEART WITHOUT ANGINA PECTORIS: Primary | ICD-10-CM

## 2018-08-29 DIAGNOSIS — G47.33 OBSTRUCTIVE SLEEP APNEA TREATED WITH CONTINUOUS POSITIVE AIRWAY PRESSURE (CPAP): ICD-10-CM

## 2018-08-29 DIAGNOSIS — E11.9 CONTROLLED TYPE 2 DIABETES MELLITUS WITHOUT COMPLICATION, WITHOUT LONG-TERM CURRENT USE OF INSULIN (CMS/HCC): ICD-10-CM

## 2018-08-29 PROCEDURE — 99214 OFFICE O/P EST MOD 30 MIN: CPT | Mod: PO | Performed by: INTERNAL MEDICINE

## 2018-08-29 PROCEDURE — 99214 OFFICE O/P EST MOD 30 MIN: CPT | Performed by: INTERNAL MEDICINE

## 2018-08-29 ASSESSMENT — ENCOUNTER SYMPTOMS
SNORING: 1
WEIGHT GAIN: 1
MEMORY LOSS: 1

## 2018-08-29 ASSESSMENT — PAIN SCALES - GENERAL: PAINLEVEL: 0-NO PAIN

## 2018-08-29 NOTE — PROGRESS NOTES
Cardiology Outpatient Follow-up Note    Subjective    Patient ID: Trenton Pulliam is a 73 y.o. male.    Chief Complaint:   Chief Complaint   Patient presents with   • Hypertension   • Hyperlipidemia       HPI this is a patient of Dr. Peña so I am seeing for the first time.  Dr. Peña last saw him in October 2016 he has coronary risk factors of hypertension hypercholesterolemia type 2 diabetes mellitus and morbid obesity.  There was not much other information and Dr. Peña's note his ejection fraction was 65% back in 7/2011.  Do have an EKG from May 2016 that showed sinus tachycardia low voltage throughout.  He is morbidly obese.  He does not do much routine aerobic activity he denies any dizziness lightheadedness or cardiac awareness he does have chronic shortness of breath he is on oxygen 1/2 L at night couple weeks ago he had a bout of chest pain left anterior chest discomfort he said it was a sharp to dull pain about the size of his baseball occurred while he was getting out of his truck and lasted for short period of time on a day-to-day basis he denies any exertional chest pain.  He does have multiple coronary risk factors.        Coronary Risk Factors    1.  Smoking:   POSITIVE - quit 1995  2.  Hyperlipidemia: POSITIVE  3.  Hypertension:  POSITIVE  4.  Diabetes mellitus: POSITIVE  5.  Sedentary lifestyle: NEGATIVE  6.  Family history of early coronary artery disease: POSITIVE      CARDIAC PROBLEM LIST:      History reviewed. No pertinent past medical history.  History reviewed. No pertinent surgical history.    Allergies as of 08/29/2018 - Reviewed 08/29/2018   Allergen Reaction Noted   • Ace inhibitors  05/26/2016   • Azithromycin Anaphylaxis 05/26/2016   • Lisinopril  05/26/2016   • Nitroglycerin Anaphylaxis 05/26/2016     Current Outpatient Prescriptions   Medication Sig Dispense Refill   • amLODIPine (NORVASC) 10 mg tablet Take 10 mg by mouth daily.     •  aspirin 81 mg EC tablet Take 81 mg by mouth daily.       • blood sugar diagnostic (PRECISION XTRA TEST) strip Use to test blood glucose three times daily 300 1   • diphenhydramine HCl (SIMPLY SLEEP ORAL) Take by mouth daily.     • empagliflozin (JARDIANCE) 25 mg tablet TAKE 1 TABLET DAILY 90 0   • ezetimibe (ZETIA) 10 mg tablet Take 10 mg by mouth daily.     • furosemide (LASIX) 40 mg tablet Take 20-40 mg by mouth daily.       • gabapentin (NEURONTIN) 300 mg capsule Take 300 mg by mouth 2 (two) times a day.     • Lactobacillus rhamnosus GG (CULTURELLE) 15 billion cell capsule, sprinkle Take by mouth daily.     • metoprolol succinate XL (TOPROL-XL) 100 mg 24 hr tablet Take 100 mg by mouth daily.     • multivitamin (THERAGRAN) tablet tablet Take 1 tablet by mouth daily.     • omega 3-dha-epa-fish oil 250-500-1,000 mg capsule Take by mouth 2 (two) times a day.     • omeprazole (PriLOSEC) 20 mg tablet,delayed release (DR/EC) Take 20 mg by mouth every evening.     • pioglitazone (ACTOS) 15 mg tablet Take 15 mg by mouth daily.     • pravastatin (PRAVACHOL) 40 mg tablet Take 40 mg by mouth daily.     • psyllium husk (METAMUCIL ORAL) Take by mouth daily.     • ranitidine (ZANTAC) 150 mg tablet Take 150 mg by mouth 2 (two) times a day.     • sitaGLIPtin-metformin (JANUMET) 50-1,000 mg per tablet Take 1 tablet by mouth daily.     • spironolactone (ALDACTONE) 25 mg tablet Take 25 mg by mouth 2 (two) times a day.     • TRICOR 145 mg tablet Take 1 tablet by mouth daily.     • valsartan (DIOVAN) 80 mg tablet Take 80 mg by mouth 2 (two) times a day.       No current facility-administered medications for this visit.        History reviewed. No pertinent family history.  Social History   Substance Use Topics   • Smoking status: Former Smoker     Quit date: 1995   • Smokeless tobacco: Never Used   • Alcohol use No       Review of Systems    Review of Systems   Constitution: Positive for weight gain.   HENT: Positive for hearing loss.     Respiratory: Positive for snoring.         Difficulty breathing lying down  Sleep apnea with CPAP with 1.5L O2   Genitourinary:        Erectile dysfuntion   Psychiatric/Behavioral: Positive for memory loss.   All other systems reviewed and are negative.  Obesity.    2.  Possible obstructive sleep apnea.    3.  Chronic renal insufficiency he does see Dr. Calderon for this.    4.  Adult onset diabetes mellitus he sees endocrinologist for this.    5.  [facility] did [facility] I asked him to have that sent to us.    6.  Allergies listed include lisinopril azithromycin Nitrostat causes him to lose consciousness.    7.  BUN 37 creatinine 1.5 calcium level 5.45 2616.  I do not have fresh labs on him.    Objective     Vitals:    08/29/18 1418   BP: 138/72   Pulse: 77   Resp: 22   SpO2: 94%     Weight: (!) 147 kg (323 lb 11.2 oz)  Physical Exam   Constitutional: He appears well-developed and well-nourished.   Pleasant morbidly obese.   HENT:   Head: Normocephalic.   Neck: Normal range of motion.   No carotid bruits.   Cardiovascular: Normal rate, regular rhythm and normal heart sounds.    Heart tones distant.   Pulmonary/Chest: Effort normal and breath sounds normal.   Abdominal: Soft.   Huge abdomen.   Skin: Skin is warm and dry.   Psychiatric: He has a normal mood and affect. His behavior is normal. Thought content normal.       Data Review:   Lab Results   Component Value Date     07/12/2018     08/25/2016    K 4.5 07/12/2018    K 4.3 08/25/2016     07/12/2018     08/25/2016    CO2 28 07/12/2018    CO2 24 08/25/2016    BUN 30 07/12/2018    BUN 13 08/25/2016    CREATININE 2.14 07/12/2018    CREATININE 1.2 08/25/2016    GLUCOSE 158 07/12/2018    GLUCOSE 125 (H) 08/25/2016    CALCIUM 9.7 07/12/2018    CALCIUM 8.6 08/25/2016     Lab Results   Component Value Date    TROPONINI <0.070 05/26/2016    BNP 8 05/26/2016     Lab Results   Component Value Date    CHOL 131 07/12/2018     TRIG 217 07/12/2018    HDL 39 07/12/2018     TSH: No results found for: TSH  Magnesium:   Lab Results   Component Value Date    MG 2.5 07/12/2018       Assessment/Plan   Diagnoses and all orders for this visit:    Coronary artery disease involving native coronary artery of native heart without angina pectoris  -     Lexiscan cardiolite complete; Future    Neuropathy    Obstructive sleep apnea treated with continuous positive airway pressure (CPAP)    Simple chronic bronchitis (CMS/HCC)    Essential hypertension    Controlled type 2 diabetes mellitus without complication, without long-term current use of insulin (CMS/[facility])    Mixed hyperlipidemia        Detailed Assessment and Plan: #1 we talked at length about coronary risk factor modification.    2.  Strongly advised into looking at gastric bypass surgery [id number] for weight loss.    3.  I believe we should do a Lexiscan Cardiolite scintigraphy scan to make sure he does not have advanced or significant coronary artery disease.  30-40 minute visit.      Electronically signed by: SALINA ZEPEDA MD  8/29/2018  2:57 PM

## 2018-09-10 ENCOUNTER — ANCILLARY PROCEDURE (OUTPATIENT)
Dept: CARDIOLOGY | Facility: CLINIC | Age: 73
End: 2018-09-10
Payer: MEDICARE

## 2018-09-10 VITALS — BODY MASS INDEX: 42.66 KG/M2 | WEIGHT: 315 LBS | HEIGHT: 72 IN

## 2018-09-10 DIAGNOSIS — I25.10 CORONARY ARTERY DISEASE INVOLVING NATIVE CORONARY ARTERY OF NATIVE HEART WITHOUT ANGINA PECTORIS: ICD-10-CM

## 2018-09-10 LAB
STRESS BASELINE BP: NORMAL MMHG
STRESS BASELINE HR: 84 BPM
STRESS O2 SAT REST: 97 %
STRESS PERCENT HR: 76 %
STRESS POST O2 SAT PEAK: 96 %
STRESS POST PEAK BP: NORMAL MMHG
STRESS POST PEAK HR: 111 BPM
STRESS TARGET HR: 125 BPM

## 2018-09-10 PROCEDURE — 78452 HT MUSCLE IMAGE SPECT MULT: CPT | Mod: 26 | Performed by: INTERNAL MEDICINE

## 2018-09-10 PROCEDURE — 93018 CV STRESS TEST I&R ONLY: CPT | Performed by: INTERNAL MEDICINE

## 2018-09-10 PROCEDURE — A9500 TC99M SESTAMIBI: HCPCS | Mod: PO

## 2018-09-10 PROCEDURE — 6360000200 HC RX 636 W HCPCS (ALT 250 FOR IP): Mod: PO | Performed by: INTERNAL MEDICINE

## 2018-09-10 PROCEDURE — 93016 CV STRESS TEST SUPVJ ONLY: CPT | Performed by: INTERNAL MEDICINE

## 2018-09-10 RX ORDER — SODIUM CHLORIDE 0.9 % (FLUSH) 0.9 %
20 SYRINGE (ML) INJECTION AS NEEDED
Status: SHIPPED | OUTPATIENT
Start: 2018-09-10

## 2018-09-10 RX ORDER — REGADENOSON 0.08 MG/ML
0.4 INJECTION, SOLUTION INTRAVENOUS ONCE
Status: COMPLETED | OUTPATIENT
Start: 2018-09-10 | End: 2018-09-10

## 2018-09-10 RX ADMIN — REGADENOSON 0.4 MG: 0.08 INJECTION, SOLUTION INTRAVENOUS at 12:59

## 2018-09-10 RX ADMIN — Medication 20 ML: at 12:59

## 2018-10-27 DIAGNOSIS — E78.2 MIXED HYPERLIPIDEMIA: Primary | ICD-10-CM

## 2018-10-29 RX ORDER — OMEGA-3-ACID ETHYL ESTERS 1 G/1
CAPSULE, LIQUID FILLED ORAL
Qty: 180 CAPSULE | Refills: 3 | Status: SHIPPED | OUTPATIENT
Start: 2018-10-29 | End: 2019-10-17 | Stop reason: SDUPTHER

## 2019-05-15 DIAGNOSIS — N18.32 CKD STAGE G3B/A1, GFR 30-44 AND ALBUMIN CREATININE RATIO <30 MG/G (CMS/HCC): Primary | ICD-10-CM

## 2019-05-15 DIAGNOSIS — R80.9 PROTEINURIA, UNSPECIFIED TYPE: ICD-10-CM

## 2019-05-28 ENCOUNTER — TELEPHONE (OUTPATIENT)
Dept: PULMONOLOGY | Facility: CLINIC | Age: 74
End: 2019-05-28

## 2019-05-28 NOTE — TELEPHONE ENCOUNTER
He was last seen in 2015 so technically a new pt. I will see him but it will need to wait until July when I have more openings. Need 1 hour and a fernando/diff prior. Dx COPD.

## 2019-05-28 NOTE — TELEPHONE ENCOUNTER
Patient is calling to reestablish with pulmonology. He states he was last seen about 2 years ago by Dr. Rose. Can we get this patient scheduled with a new provider?

## 2019-06-03 ASSESSMENT — ENCOUNTER SYMPTOMS
NUMBNESS: 0
SORE THROAT: 0
EYE PAIN: 0
DIFFICULTY URINATING: 0
JOINT SWELLING: 0
CHEST TIGHTNESS: 0
FEVER: 0
ABDOMINAL PAIN: 0
MYALGIAS: 0
APPETITE CHANGE: 0
CHILLS: 0
EYE REDNESS: 0
FATIGUE: 0
WEAKNESS: 0
HEMATURIA: 0
PALPITATIONS: 0
FLANK PAIN: 0
VOICE CHANGE: 0
FREQUENCY: 0
ABDOMINAL DISTENTION: 0
WHEEZING: 0
NERVOUS/ANXIOUS: 0
TROUBLE SWALLOWING: 0
WOUND: 0
POLYDIPSIA: 0
DYSPHORIC MOOD: 0
NECK PAIN: 0
DIZZINESS: 0
BRUISES/BLEEDS EASILY: 0
UNEXPECTED WEIGHT CHANGE: 0
DIARRHEA: 0
BLOOD IN STOOL: 0
BACK PAIN: 0
ARTHRALGIAS: 0
CONSTIPATION: 0
POLYPHAGIA: 0
COUGH: 0
DECREASED CONCENTRATION: 0
CHOKING: 0
VOMITING: 0
HEADACHES: 0
DYSURIA: 0
SLEEP DISTURBANCE: 0

## 2019-06-04 ENCOUNTER — OFFICE VISIT (OUTPATIENT)
Dept: ENDOCRINOLOGY | Facility: CLINIC | Age: 74
End: 2019-06-04
Payer: MEDICARE

## 2019-06-04 VITALS
HEIGHT: 72 IN | DIASTOLIC BLOOD PRESSURE: 61 MMHG | OXYGEN SATURATION: 90 % | HEART RATE: 79 BPM | BODY MASS INDEX: 42.66 KG/M2 | WEIGHT: 315 LBS | SYSTOLIC BLOOD PRESSURE: 120 MMHG

## 2019-06-04 DIAGNOSIS — I10 ESSENTIAL HYPERTENSION: ICD-10-CM

## 2019-06-04 DIAGNOSIS — E78.2 MIXED HYPERLIPIDEMIA: Primary | ICD-10-CM

## 2019-06-04 DIAGNOSIS — N18.32 CKD STAGE G3B/A1, GFR 30-44 AND ALBUMIN CREATININE RATIO <30 MG/G (CMS/HCC): ICD-10-CM

## 2019-06-04 DIAGNOSIS — E11.9 CONTROLLED TYPE 2 DIABETES MELLITUS WITHOUT COMPLICATION, WITHOUT LONG-TERM CURRENT USE OF INSULIN (CMS/HCC): ICD-10-CM

## 2019-06-04 DIAGNOSIS — E66.01 SEVERE OBESITY (BMI >= 40) (CMS/HCC): ICD-10-CM

## 2019-06-04 LAB — HGB A1C (AMB): 7 % (ref 4.8–6)

## 2019-06-04 PROCEDURE — 83036 HEMOGLOBIN GLYCOSYLATED A1C: CPT | Mod: QW,PO | Performed by: INTERNAL MEDICINE

## 2019-06-04 PROCEDURE — G0463 HOSPITAL OUTPT CLINIC VISIT: HCPCS | Mod: PO | Performed by: INTERNAL MEDICINE

## 2019-06-04 PROCEDURE — 99214 OFFICE O/P EST MOD 30 MIN: CPT | Performed by: INTERNAL MEDICINE

## 2019-06-04 RX ORDER — LANCETS 28 GAUGE
EACH MISCELLANEOUS
Qty: 400 EACH | Refills: 3 | Status: SHIPPED | OUTPATIENT
Start: 2019-06-04

## 2019-06-04 ASSESSMENT — PAIN SCALES - GENERAL: PAINLEVEL: 0-NO PAIN

## 2019-06-04 ASSESSMENT — ENCOUNTER SYMPTOMS: SHORTNESS OF BREATH: 1

## 2019-06-04 NOTE — PROGRESS NOTES
PROGRESS NOTE     Subjective   Trenton Pulliam is a 73 y.o. male with type 2 diabetes, which he has had for about 20 years. His past medical history is also significant for hyperlipidemia, hypertension & CKD stage 3b, he uses cpAP and oxygen at night. Diabetes complicted by neuropathy. Currently taking Janumet 50/1000mg daily, Jardiance 25mg daily & Actos 15mg daily for diabetes management. He is adamant about not trying any injectable meds.  A1c 7.0%     Trenton checks blood sugars 1 times daily. Fasting readings 125-150 in am. Denies any hypoglycemia since his last visit, stating the lowest his blood sugar gets is in the 90. [facility] - no . [facility] also [facility] & [facility] care. He is limited by exercise - trying to exercise.     Labs from 7/12/2018 are as follows:  HbA1c 7.0%, Urine albumin/creatinine ratio 11.2, PTH 42.9, BUN 30, creatinine 2.14, GFR 30.5,, , HDL 39 and LDL 48.6 - pravachol 40 mg and zetia 10 mg and tricor 145 mg. He is on fish oil.     BP is 120/61 -early on amlodipine 10 mg, Spironactone 25 mg, valsartan 80 mg, Toprol 100 mg. He is on lasix 40 mg.  He has some trouble breathing de to weight gain and altitude.     Currently Taking Medications:  Outpatient Medications Marked as Taking for the 6/4/19 encounter (Office Visit) with Leah Venegas MD   Medication Indication(s) Sig   • omega-3 acid ethyl esters (LOVAZA) 1 gram capsule   TAKE 1 CAPSULE TWICE A DAY   • psyllium husk (METAMUCIL ORAL)   Take by mouth daily.   • Lactobacillus rhamnosus GG (CULTURELLE) 15 billion cell capsule, sprinkle   Take by mouth daily.   • diphenhydramine HCl (SIMPLY SLEEP ORAL)   Take by mouth daily.   • TRICOR 145 mg tablet   Take 1 tablet by mouth daily.   • pioglitazone (ACTOS) 15 mg tablet   Take 15 mg by mouth daily.   • amLODIPine (NORVASC) 10 mg tablet   Take 10 mg by mouth daily.   • omeprazole (PriLOSEC) 20 mg tablet,delayed release  (DR/EC)   Take 20 mg by mouth every evening.   • metoprolol succinate XL (TOPROL-XL) 100 mg 24 hr tablet   Take 100 mg by mouth daily.   • pravastatin (PRAVACHOL) 40 mg tablet   Take 40 mg by mouth daily.   • ezetimibe (ZETIA) 10 mg tablet   Take 10 mg by mouth daily.   • ranitidine (ZANTAC) 150 mg tablet   Take 150 mg by mouth 2 (two) times a day.   • furosemide (LASIX) 40 mg tablet   Take 20-40 mg by mouth daily.     • valsartan (DIOVAN) 80 mg tablet   Take 80 mg by mouth 2 (two) times a day.   • spironolactone (ALDACTONE) 25 mg tablet   Take 25 mg by mouth 2 (two) times a day.   • gabapentin (NEURONTIN) 300 mg capsule   Take 300 mg by mouth 2 (two) times a day.   • multivitamin (THERAGRAN) tablet tablet   Take 1 tablet by mouth daily.   • sitaGLIPtin-metformin (JANUMET) 50-1,000 mg per tablet   Take 1 tablet by mouth daily.   • empagliflozin (JARDIANCE) 25 mg tablet   TAKE 1 TABLET DAILY   • blood sugar diagnostic (PRECISION XTRA TEST) strip   Use to test blood glucose three times daily   • aspirin 81 mg EC tablet   Take 81 mg by mouth daily.         Allergies:  Allergies   Allergen Reactions   • Ace Inhibitors      POSSIBLE ANGIOEDEMA   • Azithromycin Anaphylaxis     QUESTIONABLE ALLERGY   • Lisinopril      Other reaction(s): Cough  ANGIOEDEMA   • Nitroglycerin Anaphylaxis     SEIZURES       History Review:  History reviewed. No pertinent past medical history.    History reviewed. No pertinent surgical history.    Social History     Socioeconomic History   • Marital status:      Spouse name: Not on file   • Number of children: Not on file   • Years of education: Not on file   • Highest education level: Not on file   Occupational History   • Not on file   Social Needs   • Financial resource strain: Not on file   • Food insecurity:     Worry: Not on file     Inability: Not on file   • Transportation needs:     Medical: Not on file     Non-medical: Not on file   Tobacco Use   • Smoking status: Former Smoker      Last attempt to quit:      Years since quittin.4   • Smokeless tobacco: Never Used   Substance and Sexual Activity   • Alcohol use: No   • Drug use: No   • Sexual activity: Defer   Lifestyle   • Physical activity:     Days per week: Not on file     Minutes per session: Not on file   • Stress: Not on file   Relationships   • Social connections:     Talks on phone: Not on file     Gets together: Not on file     Attends [employer] service: Not on file     Active member of club or organization: Not on file     Attends meetings of clubs or organizations: Not on file     Relationship status: Not on file   • Intimate partner violence:     Fear of current or ex partner: Not on file     Emotionally abused: Not on file     Physically abused: Not on file     Forced sexual activity: Not on file   Other Topics Concern   • Not on file   Social History Narrative   • Not on file       History reviewed. No pertinent family history.    Review of Systems   Constitutional: Negative for appetite change, chills, fatigue, fever and unexpected weight change.             HENT: Negative for ear pain, hearing loss, sore throat, trouble swallowing and voice change.    Eyes: Negative for pain, redness and visual disturbance.   Respiratory: Positive for shortness of breath. Negative for cough, choking, chest tightness and wheezing.    Cardiovascular: Negative for chest pain and palpitations.   Gastrointestinal: Negative for abdominal distention, abdominal pain, blood in stool, constipation, diarrhea and vomiting.   Endocrine: Negative for cold intolerance, heat intolerance, polydipsia, polyphagia and polyuria.   Genitourinary: Negative for difficulty urinating, dysuria, flank pain, frequency, hematuria and urgency.   Musculoskeletal: Negative for arthralgias, back pain, gait problem, joint swelling, myalgias and neck pain.   Skin: Negative for rash and wound.   Neurological: Negative for dizziness, syncope, weakness, numbness and  headaches.   Hematological: Does not bruise/bleed easily.   Psychiatric/Behavioral: Negative for behavioral problems, decreased concentration, dysphoric mood and sleep disturbance. The patient is not nervous/anxious.          Objective   /61 (BP Location: Right arm, Patient Position: Sitting, Cuff Size: Reg)   Pulse 79   Ht 1.829 m (6')   Wt (!) 150 kg (331 lb)   SpO2 90%   BMI 44.89 kg/m²       Physical Exam   Cardiovascular:   Pulses:       Dorsalis pedis pulses are 1+ on the left side.   Musculoskeletal:        Right foot: There is no deformity.        Left foot: There is no deformity.   Feet:   Right Foot:   Protective Sensation: 10 sites tested. 10 sites sensed.   Skin Integrity: Positive for erythema (on 3rd toe - tip), callus and dry skin. Negative for ulcer, blister, skin breakdown or warmth.   Left Foot:   Protective Sensation: 10 sites tested. 10 sites sensed.   Skin Integrity: Positive for callus and dry skin. Negative for ulcer, blister, skin breakdown, erythema or warmth.     GENERAL:  Alert and oriented x3, in no apparent distress. Obese.  HEENT:  Normocephalic, atraumatic.  There is no lid lag or proptosis noted.  Sclerae are not injected.  NECK:  Thyroid is smooth, nontender, not enlarged.  No nodularities appreciated.  No bruits auscultated.  CARDIOVASCULAR:  Regular rate and rhythm.  S1, S2 are heard.  No rubs, gallops or murmurs are appreciated.  RESPIRATORY:  Lungs clear to auscultation bilaterally without rales, rhonchi or wheezing.  EXTREMITIES:  Show no clubbing, cyanosis, edema, or tremor.  NEUROLOGIC:  There is no focal deficit.  Gait is normal. DTR 1+   PSYCHIATRIC:  The patient is alert and oriented.  Mood and affect are appropriate.  SKIN:  Without significant rash or lesion.    Lab Review:  No results found for: HGBA1C  Lab Results   Component Value Date    CHOL 131 07/12/2018     Lab Results   Component Value Date    HDL 39 07/12/2018     Lab Results   Component Value Date     LDLCALC 48.6 07/12/2018     Lab Results   Component Value Date    TRIG 217 07/12/2018     Lab Results   Component Value Date    MICROALBUR 0.7 07/12/2018     No results found for: TSH  Lab Results   Component Value Date    GLUCOSE 158 07/12/2018    CALCIUM 9.7 07/12/2018     07/12/2018    K 4.5 07/12/2018    CO2 28 07/12/2018     07/12/2018    BUN 30 07/12/2018    CREATININE 2.14 07/12/2018    ANIONGAP 11.0 08/25/2016           Assessment/Plan   Trenton was seen today for diabetes type ii.    Diagnoses and all orders for this visit:    Mixed hyperlipidemia    Severe obesity (BMI >= 40) (CMS/[facility]) ([facility])    Essential hypertension    CKD stage G3b/A1, GFR 30-44 and albumin creatinine ratio <30 mg/g (CMS/[facility]) ([facility])    Controlled type 2 diabetes mellitus without complication, without long-term current use of insulin (CMS/[facility]) ([facility])  -     lancets (freestyle) 28 gauge misc; Check blood sugar four times a day or as directed           1.  Type 2 diabetes without complications: His most recent A1c 7.0% .  He is currently on Janumet 50/1000mg once daily due to low GFR, Jardiance 25mg daily & Actos 15 mg.  He is adamant about not using any injectable medicines.  Patient was encouraged to continue work on diet and exercise modification. He will try to lose weight.     In regard to complications - he has no retinopathy no nephropathy and no neuropathy.  [facility] The patient does see nephrology and his chronic kidney disease stage IIIb [facility] will be [facility] here.      2.  Hypertension: he is on multiple agents.  His most recent BUN/creatinine is slightly elevated at 30 and 2.4.  His GFR is 30.  He is on numerous agents including many diuretics.  He does see Dr. Vann tomorrow for cardiology.  My recommendation would be to potentially stop the spironolactone if at all possible from a cardiology standpoint.        3.  Mixed hyperlipidemia: His LDL is at goal  less than 100.  He is on Pravachol 40 mg, Zetia 10 mg. He is also on fenofibrate and fish oil.         A voice recognition program was used to aid in documentation of this record. Sometimes words are not printed exactly as they were spoken.  While efforts were made to carefully edit and correct any inaccuracies, some errors may be present; please take these into context.  Please contact the provider if areas are identified            No follow-ups on file.

## 2019-06-04 NOTE — LETTER
MEDICAL CLINIC ENDOCRINOLOGY  [address] [facility] [place] [id number] [phone]  Dept: [phone]  June 4, 2019     AURELIANO Matthews  [address]  [place] [address]    Patient: Trenton Pulliam   YOB: 1945   Date of Visit: 6/4/2019       To:  AURELIANO Matthews    Our mutual patient, Trenton Pulliam, was seen in my office on 6/4/2019. Below are my notes.     KOBI VALDOVINOS MD  6/4/2019 11:08 AM  Signed  PROGRESS NOTE     Subjective   Trenton Pulliam is a 73 y.o. male with type 2 diabetes, which he has had for about 20 years. His past medical history is also significant for hyperlipidemia, hypertension & CKD stage 3b, he uses cpAP and oxygen at night. Diabetes complicted by neuropathy. Currently taking Janumet 50/1000mg daily, Jardiance 25mg daily & Actos 15mg daily for diabetes management. He is adamant about not trying any injectable meds.  A1c 7.0%     Trenton checks blood sugars 1 times daily. Fasting readings 125-150 in am. Denies any hypoglycemia since his last visit, stating the lowest his blood sugar gets is in the 90. [facility]. He is limited by exercise - trying to exercise.     Labs from 7/12/2018 are as follows:  HbA1c 7.0%, Urine albumin/creatinine ratio 11.2, PTH 42.9, BUN 30, creatinine 2.14, GFR 30.5,, , HDL 39 and LDL 48.6 - pravachol 40 mg and zetia 10 mg and tricor 145 mg. He is on fish oil.     BP is 120/61 -early on amlodipine 10 mg, Spironactone 25 mg, valsartan 80 mg, Toprol 100 mg. He is on lasix 40 mg.  He has some trouble breathing de to weight gain and altitude.     Currently Taking Medications:  Outpatient Medications Marked as Taking for the 6/4/19 encounter (Office Visit) with Kobi Valdovinos MD   Medication Indication(s) Sig   • omega-3 acid ethyl esters (LOVAZA) 1 gram capsule   TAKE 1 CAPSULE TWICE A DAY   • psyllium husk (METAMUCIL ORAL)    Take by mouth daily.   • Lactobacillus rhamnosus GG (CULTURELLE) 15 billion cell capsule, sprinkle   Take by mouth daily.   • diphenhydramine HCl (SIMPLY SLEEP ORAL)   Take by mouth daily.   • TRICOR 145 mg tablet   Take 1 tablet by mouth daily.   • pioglitazone (ACTOS) 15 mg tablet   Take 15 mg by mouth daily.   • amLODIPine (NORVASC) 10 mg tablet   Take 10 mg by mouth daily.   • omeprazole (PriLOSEC) 20 mg tablet,delayed release (DR/EC)   Take 20 mg by mouth every evening.   • metoprolol succinate XL (TOPROL-XL) 100 mg 24 hr tablet   Take 100 mg by mouth daily.   • pravastatin (PRAVACHOL) 40 mg tablet   Take 40 mg by mouth daily.   • ezetimibe (ZETIA) 10 mg tablet   Take 10 mg by mouth daily.   • ranitidine (ZANTAC) 150 mg tablet   Take 150 mg by mouth 2 (two) times a day.   • furosemide (LASIX) 40 mg tablet   Take 20-40 mg by mouth daily.     • valsartan (DIOVAN) 80 mg tablet   Take 80 mg by mouth 2 (two) times a day.   • spironolactone (ALDACTONE) 25 mg tablet   Take 25 mg by mouth 2 (two) times a day.   • gabapentin (NEURONTIN) 300 mg capsule   Take 300 mg by mouth 2 (two) times a day.   • multivitamin (THERAGRAN) tablet tablet   Take 1 tablet by mouth daily.   • sitaGLIPtin-metformin (JANUMET) 50-1,000 mg per tablet   Take 1 tablet by mouth daily.   • empagliflozin (JARDIANCE) 25 mg tablet   TAKE 1 TABLET DAILY   • blood sugar diagnostic (PRECISION XTRA TEST) strip   Use to test blood glucose three times daily   • aspirin 81 mg EC tablet   Take 81 mg by mouth daily.         Allergies:  Allergies   Allergen Reactions   • Ace Inhibitors      POSSIBLE ANGIOEDEMA   • Azithromycin Anaphylaxis     QUESTIONABLE ALLERGY   • Lisinopril      Other reaction(s): Cough  ANGIOEDEMA   • Nitroglycerin Anaphylaxis     SEIZURES       History Review:  History reviewed. No pertinent past medical history.    History reviewed. No pertinent surgical history.    Social History     Socioeconomic History   • Marital status:       Spouse name: Not on file   • Number of children: Not on file   • Years of education: Not on file   • Highest education level: Not on file   Occupational History   • Not on file   Social Needs   • Financial resource strain: Not on file   • Food insecurity:     Worry: Not on file     Inability: Not on file   • Transportation needs:     Medical: Not on file     Non-medical: Not on file   Tobacco Use   • Smoking status: Former Smoker     Last attempt to quit:      Years since quittin.4   • Smokeless tobacco: Never Used   Substance and Sexual Activity   • Alcohol use: No   • Drug use: No   • Sexual activity: Defer   Lifestyle   • Physical activity:     Days per week: Not on file     Minutes per session: Not on file   • Stress: Not on file   Relationships   • Social connections:     Talks on phone: Not on file     Gets together: Not on file     Attends Christian service: Not on file     Active member of club or organization: Not on file     Attends meetings of clubs or organizations: Not on file     Relationship status: Not on file   • Intimate partner violence:     Fear of current or ex partner: Not on file     Emotionally abused: Not on file     Physically abused: Not on file     Forced sexual activity: Not on file   Other Topics Concern   • Not on file   Social History Narrative   • Not on file       History reviewed. No pertinent family history.    Review of Systems   Constitutional: Negative for appetite change, chills, fatigue, fever and unexpected weight change.             HENT: Negative for ear pain, hearing loss, sore throat, trouble swallowing and voice change.    Eyes: Negative for pain, redness and visual disturbance.   Respiratory: Positive for shortness of breath. Negative for cough, choking, chest tightness and wheezing.    Cardiovascular: Negative for chest pain and palpitations.   Gastrointestinal: Negative for abdominal distention, abdominal pain, blood in stool, constipation, diarrhea and  vomiting.   Endocrine: Negative for cold intolerance, heat intolerance, polydipsia, polyphagia and polyuria.   Genitourinary: Negative for difficulty urinating, dysuria, flank pain, frequency, hematuria and urgency.   Musculoskeletal: Negative for arthralgias, back pain, gait problem, joint swelling, myalgias and neck pain.   Skin: Negative for rash and wound.   Neurological: Negative for dizziness, syncope, weakness, numbness and headaches.   Hematological: Does not bruise/bleed easily.   Psychiatric/Behavioral: Negative for behavioral problems, decreased concentration, dysphoric mood and sleep disturbance. The patient is not nervous/anxious.          Objective   /61 (BP Location: Right arm, Patient Position: Sitting, Cuff Size: Reg)   Pulse 79   Ht 1.829 m (6')   Wt (!) 150 kg (331 lb)   SpO2 90%   BMI 44.89 kg/m²        Physical Exam   Cardiovascular:   Pulses:       Dorsalis pedis pulses are 1+ on the left side.   Musculoskeletal:        Right foot: There is no deformity.        Left foot: There is no deformity.   Feet:   Right Foot:   Protective Sensation: 10 sites tested. 10 sites sensed.   Skin Integrity: Positive for erythema (on 3rd toe - tip), callus and dry skin. Negative for ulcer, blister, skin breakdown or warmth.   Left Foot:   Protective Sensation: 10 sites tested. 10 sites sensed.   Skin Integrity: Positive for callus and dry skin. Negative for ulcer, blister, skin breakdown, erythema or warmth.     GENERAL:  Alert and oriented x3, in no apparent distress. Obese.  HEENT:  Normocephalic, atraumatic.  There is no lid lag or proptosis noted.  Sclerae are not injected.  NECK:  Thyroid is smooth, nontender, not enlarged.  No nodularities appreciated.  No bruits auscultated.  CARDIOVASCULAR:  Regular rate and rhythm.  S1, S2 are heard.  No rubs, gallops or murmurs are appreciated.  RESPIRATORY:  Lungs clear to auscultation bilaterally without rales, rhonchi or wheezing.  EXTREMITIES:  Show no  clubbing, cyanosis, edema, or tremor.  NEUROLOGIC:  There is no focal deficit.  Gait is normal. DTR 1+   PSYCHIATRIC:  The patient is alert and oriented.  Mood and affect are appropriate.  SKIN:  Without significant rash or lesion.    Lab Review:  No results found for: HGBA1C  Lab Results   Component Value Date    CHOL 131 07/12/2018     Lab Results   Component Value Date    HDL 39 07/12/2018     Lab Results   Component Value Date    LDLCALC 48.6 07/12/2018     Lab Results   Component Value Date    TRIG 217 07/12/2018     Lab Results   Component Value Date    MICROALBUR 0.7 07/12/2018     No results found for: TSH  Lab Results   Component Value Date    GLUCOSE 158 07/12/2018    CALCIUM 9.7 07/12/2018     07/12/2018    K 4.5 07/12/2018    CO2 28 07/12/2018     07/12/2018    BUN 30 07/12/2018    CREATININE 2.14 07/12/2018    ANIONGAP 11.0 08/25/2016           Assessment/Plan   Trenton was seen today for diabetes type ii.    Diagnoses and all orders for this visit:    Mixed hyperlipidemia    Severe obesity (BMI >= 40) (CMS/[facility]) ([facility])    Essential hypertension    CKD stage G3b/A1, GFR 30-44 and albumin creatinine ratio <30 mg/g (CMS/[facility]) ([facility])    Controlled type 2 diabetes mellitus without complication, without long-term current use of insulin (CMS/[facility]) ([facility])  -     lancets (freestyle) 28 gauge misc; Check blood sugar four times a day or as directed           1.  Type 2 diabetes without complications: His most recent A1c 7.0% .  He is currently on Janumet 50/1000mg once daily due to low GFR, Jardiance 25mg daily & Actos 15 mg.  He is adamant about not using any injectable medicines.  Patient was encouraged to continue work on diet and exercise modification. He will try to lose weight.     In regard to complications - he has no retinopathy no nephropathy and no neuropathy.  [facility] The patient does see nephrology and his chronic kidney disease stage IIIb [facility] and request they will be sent here.      2.  Hypertension: he is on multiple agents.  His most recent BUN/creatinine is slightly elevated at 30 and 2.4.  His GFR is 30.  He is on numerous agents including many diuretics.  He does see Dr. Vann tomorrow for cardiology.  My recommendation would be to potentially stop the spironolactone if at all possible from a cardiology standpoint.        3.  Mixed hyperlipidemia: His LDL is at goal less than 100.  He is on Pravachol 40 mg, Zetia 10 mg. He is also on fenofibrate and fish oil.         A voice recognition program was used to aid in documentation of this record. Sometimes words are not printed exactly as they were spoken.  While efforts were made to carefully edit and correct any inaccuracies, some errors may be present; please take these into context.  Please contact the provider if areas are identified            No follow-ups on file.               If you have questions, please do not hesitate to call me. I look forward to following your patient along with you.         Sincerely,        KOBI VALDOVINOS MD        CC: No Recipients

## 2019-06-18 LAB
EXTERNAL CALCIUM (MG/DL) IN SER/PLAS: 9.8 MG/DL
EXTERNAL CARBON DIOXIDE, TOTAL (MMOL/L) IN SER/PLAS: 30 MMOL/L
EXTERNAL CHLORIDE (MMOL/L) IN SER/PLAS: 101 MMOL/L
EXTERNAL CREATININE (MG/DL) IN SER/PLAS: 1.9 MG/DL
EXTERNAL GLOMERULAR FILTRATION RATE ML/MIN/1.73 SQ M.PREDICTED: 34.9 ML/MIN/1.73M*2
EXTERNAL GLUCOSE (MG/DL) IN SER/PLAS: 149 MG/DL
EXTERNAL POTASSIUM (MMOL/L) IN SER/PLAS: 5.8 MMOL/L
EXTERNAL SODIUM (MMOL/L) IN SER/PLAS: 145 MMOL/L
EXTERNAL UREA NITROGEN (MG/DL) IN SER/PLAS: 34 MG/DL

## 2019-06-20 ENCOUNTER — TELEPHONE (OUTPATIENT)
Dept: ENDOCRINOLOGY | Facility: CLINIC | Age: 74
End: 2019-06-20

## 2019-06-21 ENCOUNTER — TELEPHONE (OUTPATIENT)
Dept: ENDOCRINOLOGY | Facility: CLINIC | Age: 74
End: 2019-06-21

## 2019-06-21 NOTE — TELEPHONE ENCOUNTER
Regarding: RE: Test Results Question  Contact: [phone]  ----- Message from Kya Cheung LPN sent at 6/19/2019  3:33 PM MDT -----  FYI, should be getting lab results soon.     ----- Message sent from Kya Cheung LPN to Trenton Pulliam at 6/19/2019  3:33 PM -----   Good afternoon Trenton,  Thank you for reaching out to the office!  I will forward your message along to Dr. Valdovinos so that she is aware.  We will gladly reach out to the [facility] if the lab results do not arrive soon. Please let us know if you have any questions or concerns. Thank you!  Kya FELDMAN LPN      ----- Message -----     From: Trenton Pulliam     Sent: 6/18/2019  7:03 PM MDT       To: KOBI VALDOVINOS MD  Subject: Test Results Question    Dr. FERNANDEZ, [facility], today to receive lab results.  They told me you can actually request the latest labs now.   The release is good for three years.  If you don’t receive today’s results in a reasonable length of time you should be able to call them and have them send a copy.    Trenton Pulliam

## 2019-06-21 NOTE — TELEPHONE ENCOUNTER
Duplicate encounter closed: received letter containing pt lab results from 6/18/19. Results placed on provider desk for review and recommendations.

## 2019-06-21 NOTE — TELEPHONE ENCOUNTER
Received letter containing pt lab results from 6/18/19. Results placed on provider desk for review and recommendations.

## 2019-07-24 ENCOUNTER — OFFICE VISIT (OUTPATIENT)
Dept: PULMONOLOGY | Facility: CLINIC | Age: 74
End: 2019-07-24
Payer: MEDICARE

## 2019-07-24 ENCOUNTER — ANCILLARY PROCEDURE (OUTPATIENT)
Dept: PULMONOLOGY | Facility: CLINIC | Age: 74
End: 2019-07-24
Payer: MEDICARE

## 2019-07-24 VITALS
BODY MASS INDEX: 42.66 KG/M2 | HEIGHT: 72 IN | RESPIRATION RATE: 16 BRPM | SYSTOLIC BLOOD PRESSURE: 108 MMHG | DIASTOLIC BLOOD PRESSURE: 62 MMHG | HEART RATE: 70 BPM | OXYGEN SATURATION: 91 % | WEIGHT: 315 LBS

## 2019-07-24 DIAGNOSIS — J41.0 SIMPLE CHRONIC BRONCHITIS (CMS/HCC): ICD-10-CM

## 2019-07-24 DIAGNOSIS — J44.9 CHRONIC OBSTRUCTIVE PULMONARY DISEASE, UNSPECIFIED COPD TYPE (CMS/HCC): Primary | ICD-10-CM

## 2019-07-24 LAB
SPO2 (RT): 88 %
SPO2 (RT): 89 %
SPO2 (RT): 93 %
SPO2 (RT): 96 %

## 2019-07-24 PROCEDURE — G0463 HOSPITAL OUTPT CLINIC VISIT: HCPCS | Mod: 25,PO | Performed by: PHYSICIAN ASSISTANT

## 2019-07-24 PROCEDURE — 94729 DIFFUSING CAPACITY: CPT | Mod: 26 | Performed by: INTERNAL MEDICINE

## 2019-07-24 PROCEDURE — 94010 BREATHING CAPACITY TEST: CPT | Mod: 26 | Performed by: INTERNAL MEDICINE

## 2019-07-24 PROCEDURE — 99215 OFFICE O/P EST HI 40 MIN: CPT | Mod: 25 | Performed by: PHYSICIAN ASSISTANT

## 2019-07-24 PROCEDURE — 94729 DIFFUSING CAPACITY: CPT | Mod: PO

## 2019-07-24 RX ORDER — CICLOPIROX 80 MG/ML
SOLUTION TOPICAL
Refills: 1 | COMMUNITY
Start: 2019-06-20 | End: 2021-06-22 | Stop reason: ALTCHOICE

## 2019-07-24 NOTE — LETTER
[facility] CLINIC PULMONOLOGY  [address] [facility] [place] [address]  [phone]  Dept: [phone]  July 24, 2019     [facility] [place]  [address]  [place] [address]    Patient: Trenton Pulliam   YOB: 1945   Date of Visit: 7/24/2019       To:  [facility] [place]    Our mutual patient, Trenton Pulliam, was seen in my office on 7/24/2019. Below are my notes.     AURELIANO Lewis  7/24/2019  4:02 PM  Signed    PULMONARY NOTE      HPI:  Trenton Pulliam is a 73 y.o. male patient presents for follow-up regarding COPD.  He is a former patient of Dr. Rose, last visit was in 2015.  Patient has an 80-pack-year smoking history.  Pertinent notes from 2015 he was on Advair 100/50 mcg.  He was tried on Spiriva but did not notice much benefit with this.  Patient tells me that he stopped using the Advair on his own a few years ago.  Patient torres in [place].  When he is here he lives in [place].  They are planning to move to [place] full-time in October but will visit [place] routinely as that is where their grandchildren live.  Patient does not have a pulmonologist in [place] but can establish with one.  Patient reports significant dyspnea with exertion.  With walking he tends to need to go at a slower pace.  Walking up about 4 stairs he will need to stop and rest.  Working with something in his garage he gets short of breath quite quickly.  He does have the albuterol inhaler and will use this sporadically.  There are times when he gets quite short of breath and starts to panic.  He will use the albuterol in the situation.  Sometimes he will go inside and put his oxygen on as well.  He does note that the albuterol is helpful.  Patient reports occasional wheeze.  This is more common when he is also short of breath.  He denies any chest tightness or chest pain.  Patient admits to cough.  Sometimes he will have coughing fits.  He will sometimes cough up some clear mucus.  This happens maybe every other  day.  Patient does admit to peripheral edema.  He is on Lasix.  He denies any difficulty with swallowing.  Patient denies a history of frequent exacerbations.  He did have bronchitis last year and did receive treatment with prednisone and antibiotics.  Patient does have history of sleep apnea.  He wears CPAP at night with 1.5 L of oxygen.  Other past medical history includes CKD, hypertension, type 2 diabetes, peripheral neuropathy, and coronary artery disease.      Review of Systems . Pertinent positives and negatives listed in the HPI.     The following portions of the patient's history were reviewed and updated as appropriate: allergies, current medications, past family history, past medical history, past social history, past surgical history and problem list.    History:  Social History     Tobacco Use   • Smoking status: Former Smoker     Packs/day: 2.50     Years: 35.00     Pack years: 87.50     Types: Cigarettes     Last attempt to quit:      Years since quittin.5   • Smokeless tobacco: Never Used   Substance Use Topics   • Alcohol use: No   • Drug use: No         Immunizations:  Immunization History   Administered Date(s) Administered   • Influenza TIV (IM) 2016       Medications:    Current Outpatient Medications:   •  ciclopirox (PENLAC) 8 % solution, ALEJO TO NAILS D, Disp: , Rfl: 1  •  multivitamin-minerals-lutein tablet, TAKE MENS ONE A DAY MULTIVITAMIN BY MOUTH ONCE EVERY DAY, Disp: , Rfl:   •  lancets (freestyle) 28 gauge misc, Check blood sugar four times a day or as directed, Disp: 400 each, Rfl: 3  •  omega 3-dha-epa-fish oil 250-500-1,000 mg capsule, Take by mouth 2 (two) times a day., Disp: , Rfl:   •  psyllium husk (METAMUCIL ORAL), Take by mouth daily., Disp: , Rfl:   •  Lactobacillus rhamnosus GG (CULTURELLE) 15 billion cell capsule, sprinkle, Take by mouth daily., Disp: , Rfl:   •  diphenhydramine HCl (SIMPLY SLEEP ORAL), Take by mouth daily., Disp: , Rfl:   •  TRICOR 145 mg  tablet, Take 1 tablet by mouth daily., Disp: , Rfl:   •  amLODIPine (NORVASC) 10 mg tablet, Take 10 mg by mouth daily., Disp: , Rfl:   •  omeprazole (PriLOSEC) 20 mg tablet,delayed release (DR/EC), Take 20 mg by mouth every evening., Disp: , Rfl:   •  metoprolol succinate XL (TOPROL-XL) 100 mg 24 hr tablet, Take 100 mg by mouth daily., Disp: , Rfl:   •  pravastatin (PRAVACHOL) 40 mg tablet, Take 40 mg by mouth daily., Disp: , Rfl:   •  ezetimibe (ZETIA) 10 mg tablet, Take 10 mg by mouth daily., Disp: , Rfl:   •  ranitidine (ZANTAC) 150 mg tablet, Take 150 mg by mouth 2 (two) times a day., Disp: , Rfl:   •  furosemide (LASIX) 40 mg tablet, Take 20-40 mg by mouth daily.  , Disp: , Rfl:   •  valsartan (DIOVAN) 80 mg tablet, Take 80 mg by mouth 2 (two) times a day., Disp: , Rfl:   •  spironolactone (ALDACTONE) 25 mg tablet, Take 25 mg by mouth 2 (two) times a day., Disp: , Rfl:   •  gabapentin (NEURONTIN) 300 mg capsule, Take 300 mg by mouth 2 (two) times a day., Disp: , Rfl:   •  empagliflozin (JARDIANCE) 25 mg tablet, TAKE 1 TABLET DAILY, Disp: 90, Rfl: 0  •  blood sugar diagnostic (PRECISION XTRA TEST) strip, Use to test blood glucose three times daily, Disp: 300, Rfl: 1  •  aspirin 81 mg EC tablet, Take 81 mg by mouth daily.  , Disp: , Rfl:   •  umeclidinium-vilanterol (ANORO ELLIPTA) 62.5-25 mcg/actuation blister with device, Inhale 1 puff daily, Disp: 3 Inhaler, Rfl: 3  •  omega-3 acid ethyl esters (LOVAZA) 1 gram capsule, TAKE 1 CAPSULE TWICE A DAY (Patient not taking: Reported on 7/24/2019), Disp: 180 capsule, Rfl: 3  •  pioglitazone (ACTOS) 15 mg tablet, Take 15 mg by mouth daily., Disp: , Rfl:   •  multivitamin (THERAGRAN) tablet tablet, Take 1 tablet by mouth daily., Disp: , Rfl:   •  sitaGLIPtin-metformin (JANUMET) 50-1,000 mg per tablet, Take 1 tablet by mouth daily., Disp: , Rfl:   No current facility-administered medications for this visit.     Facility-Administered Medications Ordered in Other Visits:    •  sodium chloride flush 20 mL, 20 mL, intravenous, PRN, Dragan Vann MD, 20 mL at 09/10/18 1259    Allergies:  Allergies   Allergen Reactions   • Ace Inhibitors      POSSIBLE ANGIOEDEMA   • Azithromycin Anaphylaxis     QUESTIONABLE ALLERGY   • Lisinopril      Other reaction(s): Cough  ANGIOEDEMA   • Nitroglycerin Anaphylaxis     SEIZURES   • Losartan-Hydrochlorothiazide      Other reaction(s): PNEUMONIA, RENAL FAILURE SYNDROME       Objective:  Vitals:    07/24/19 1240   BP: 108/62   BP Location: Left arm   Patient Position: Sitting   Cuff Size: Regular   Pulse: 70   Resp: 16   SpO2: 91%   Weight: (!) 152 kg (334 lb)   Height: 1.829 m (6')       Physical Exam   Constitutional: He is oriented to person, place, and time. He appears well-developed and well-nourished. No distress.   Cardiovascular: Normal rate, regular rhythm and normal heart sounds. Exam reveals no gallop and no friction rub.   No murmur heard.  Pulmonary/Chest: Effort normal. No tachypnea. No respiratory distress. He has decreased breath sounds. He has no wheezes. He has no rhonchi. He has no rales.   Musculoskeletal: He exhibits no edema.   Neurological: He is alert and oriented to person, place, and time.   Skin: Skin is warm and dry.   Psychiatric: He has a normal mood and affect. His behavior is normal.   Nursing note and vitals reviewed.          PFT:  [place]/diff was done today: FVC 3.42 (77% predicted), FEV1 1.86 (56% predicted), FEV1/FVC 54%. DLCO 25.04 (74% predicted).   Spirometry shows moderate airflow obstruction.  Diffusion capacity is mildly reduced.  In 2015 FVC was 4.16, FEV1 2.34, DLCO 27.93.      Pulse Oximetry while ambulating    Ref Range & Units 14:33   SpO2 % 93    Comment: resting on room air HR 79   SpO2 % 88    Comment: walking on room air 290ft HR 97   SpO2 % 96    Comment: resting on 1L of O2 HR 71   SpO2 % 89    Comment: walking 360ft on 1L O2 HR 97                   Assessment:  1.  COPD with moderate airflow  obstruction  2.  Chronic hypoxia  3.  Sleep apnea - on CPAP with oxygen  4.  Obesity    PLAN:  1.  Start anoro inhaler 1 puff daily. Samples provided to patient. Use of device demonstrated. Patient took first dose in clinic with direct observation for proper technique. Patient advised of common side effects of medication.  2.  Continue albuterol every 4 hours as needed  3.  Start oxygen 2 L pulsed during the day  4.  Continue CPAP at night with 1.5 L oxygen    Follow-up in 2 months or sooner as needed      1. Chronic obstructive pulmonary disease, unspecified COPD type (CMS/HCC) ([facility])    2. Simple chronic bronchitis (CMS/HCC) ([facility])             Pt voices understanding and agreement to plan as stated above. All questions answered.     Total time face to face spent with patient was 40 minutes with more than 50% in counseling and coordination of care regarding the listed diagnoses.    AURELIANO Lewis      A voice recognition program was used to aid in medical record documentation. Sometimes words are printed not exactly as they were spoken. While efforts were made to carefully edit and correct any inaccuracies, some errors may be present. Errors should be taken within the context of the discussion.  Please contact our office if you need assistance interpreting this medical record or notice any mistakes.                If you have questions, please do not hesitate to call me. I look forward to following your patient along with you.         Sincerely,        AURELIANO Lewis        CC: No Recipients

## 2019-07-24 NOTE — PROGRESS NOTES
PULMONARY NOTE      HPI:  Trenton Pulliam is a 73 y.o. male patient presents for follow-up regarding COPD.  He is a former patient of Dr. Rose, last visit was in 2015.  Patient has an 80-pack-year smoking history.  Pertinent notes from 2015 he was on Advair 100/50 mcg.  He was tried on Spiriva but did not notice much benefit with this.  Patient tells me that he stopped using the Advair on his own a few years ago.  Patient torres in [place].  When he is here he lives in [place].  They are planning to move to [place] full-time in October but will visit [place] routinely as that is where their grandchildren live.  Patient does not have a pulmonologist in [place] but can establish with one.  Patient reports significant dyspnea with exertion.  With walking he tends to need to go at a slower pace.  Walking up about 4 stairs he will need to stop and rest.  Working with something in his garage he gets short of breath quite quickly.  He does have the albuterol inhaler and will use this sporadically.  There are times when he gets quite short of breath and starts to panic.  He will use the albuterol in the situation.  Sometimes he will go inside and put his oxygen on as well.  He does note that the albuterol is helpful.  Patient reports occasional wheeze.  This is more common when he is also short of breath.  He denies any chest tightness or chest pain.  Patient admits to cough.  Sometimes he will have coughing fits.  He will sometimes cough up some clear mucus.  This happens maybe every other day.  Patient does admit to peripheral edema.  He is on Lasix.  He denies any difficulty with swallowing.  Patient denies a history of frequent exacerbations.  He did have bronchitis last year and did receive treatment with prednisone and antibiotics.  Patient does have history of sleep apnea.  He wears CPAP at night with 1.5 L of oxygen.  Other past medical history includes CKD, hypertension, type 2 diabetes, peripheral  neuropathy, and coronary artery disease.      Review of Systems . Pertinent positives and negatives listed in the HPI.     The following portions of the patient's history were reviewed and updated as appropriate: allergies, current medications, past family history, past medical history, past social history, past surgical history and problem list.    History:  Social History     Tobacco Use   • Smoking status: Former Smoker     Packs/day: 2.50     Years: 35.00     Pack years: 87.50     Types: Cigarettes     Last attempt to quit: 1995     Years since quittin.5   • Smokeless tobacco: Never Used   Substance Use Topics   • Alcohol use: No   • Drug use: No         Immunizations:  Immunization History   Administered Date(s) Administered   • Influenza TIV (IM) 2016       Medications:    Current Outpatient Medications:   •  ciclopirox (PENLAC) 8 % solution, ALEJO TO NAILS D, Disp: , Rfl: 1  •  multivitamin-minerals-lutein tablet, TAKE MENS ONE A DAY MULTIVITAMIN BY MOUTH ONCE EVERY DAY, Disp: , Rfl:   •  lancets (freestyle) 28 gauge misc, Check blood sugar four times a day or as directed, Disp: 400 each, Rfl: 3  •  omega 3-dha-epa-fish oil 250-500-1,000 mg capsule, Take by mouth 2 (two) times a day., Disp: , Rfl:   •  psyllium husk (METAMUCIL ORAL), Take by mouth daily., Disp: , Rfl:   •  Lactobacillus rhamnosus GG (CULTURELLE) 15 billion cell capsule, sprinkle, Take by mouth daily., Disp: , Rfl:   •  diphenhydramine HCl (SIMPLY SLEEP ORAL), Take by mouth daily., Disp: , Rfl:   •  TRICOR 145 mg tablet, Take 1 tablet by mouth daily., Disp: , Rfl:   •  amLODIPine (NORVASC) 10 mg tablet, Take 10 mg by mouth daily., Disp: , Rfl:   •  omeprazole (PriLOSEC) 20 mg tablet,delayed release (DR/EC), Take 20 mg by mouth every evening., Disp: , Rfl:   •  metoprolol succinate XL (TOPROL-XL) 100 mg 24 hr tablet, Take 100 mg by mouth daily., Disp: , Rfl:   •  pravastatin (PRAVACHOL) 40 mg tablet, Take 40 mg by mouth daily., Disp: ,  Rfl:   •  ezetimibe (ZETIA) 10 mg tablet, Take 10 mg by mouth daily., Disp: , Rfl:   •  ranitidine (ZANTAC) 150 mg tablet, Take 150 mg by mouth 2 (two) times a day., Disp: , Rfl:   •  furosemide (LASIX) 40 mg tablet, Take 20-40 mg by mouth daily.  , Disp: , Rfl:   •  valsartan (DIOVAN) 80 mg tablet, Take 80 mg by mouth 2 (two) times a day., Disp: , Rfl:   •  spironolactone (ALDACTONE) 25 mg tablet, Take 25 mg by mouth 2 (two) times a day., Disp: , Rfl:   •  gabapentin (NEURONTIN) 300 mg capsule, Take 300 mg by mouth 2 (two) times a day., Disp: , Rfl:   •  empagliflozin (JARDIANCE) 25 mg tablet, TAKE 1 TABLET DAILY, Disp: 90, Rfl: 0  •  blood sugar diagnostic (PRECISION XTRA TEST) strip, Use to test blood glucose three times daily, Disp: 300, Rfl: 1  •  aspirin 81 mg EC tablet, Take 81 mg by mouth daily.  , Disp: , Rfl:   •  umeclidinium-vilanterol (ANORO ELLIPTA) 62.5-25 mcg/actuation blister with device, Inhale 1 puff daily, Disp: 3 Inhaler, Rfl: 3  •  omega-3 acid ethyl esters (LOVAZA) 1 gram capsule, TAKE 1 CAPSULE TWICE A DAY (Patient not taking: Reported on 7/24/2019), Disp: 180 capsule, Rfl: 3  •  pioglitazone (ACTOS) 15 mg tablet, Take 15 mg by mouth daily., Disp: , Rfl:   •  multivitamin (THERAGRAN) tablet tablet, Take 1 tablet by mouth daily., Disp: , Rfl:   •  sitaGLIPtin-metformin (JANUMET) 50-1,000 mg per tablet, Take 1 tablet by mouth daily., Disp: , Rfl:   No current facility-administered medications for this visit.     Facility-Administered Medications Ordered in Other Visits:   •  sodium chloride flush 20 mL, 20 mL, intravenous, PRN, Dragan Vann MD, 20 mL at 09/10/18 1259    Allergies:  Allergies   Allergen Reactions   • Ace Inhibitors      POSSIBLE ANGIOEDEMA   • Azithromycin Anaphylaxis     QUESTIONABLE ALLERGY   • Lisinopril      Other reaction(s): Cough  ANGIOEDEMA   • Nitroglycerin Anaphylaxis     SEIZURES   • Losartan-Hydrochlorothiazide      Other reaction(s): PNEUMONIA, RENAL FAILURE  SYNDROME       Objective:  Vitals:    07/24/19 1240   BP: 108/62   BP Location: Left arm   Patient Position: Sitting   Cuff Size: Regular   Pulse: 70   Resp: 16   SpO2: 91%   Weight: (!) 152 kg (334 lb)   Height: 1.829 m (6')       Physical Exam   Constitutional: He is oriented to person, place, and time. He appears well-developed and well-nourished. No distress.   Cardiovascular: Normal rate, regular rhythm and normal heart sounds. Exam reveals no gallop and no friction rub.   No murmur heard.  Pulmonary/Chest: Effort normal. No tachypnea. No respiratory distress. He has decreased breath sounds. He has no wheezes. He has no rhonchi. He has no rales.   Musculoskeletal: He exhibits no edema.   Neurological: He is alert and oriented to person, place, and time.   Skin: Skin is warm and dry.   Psychiatric: He has a normal mood and affect. His behavior is normal.   Nursing note and vitals reviewed.          PFT:  James/diff was done today: FVC 3.42 (77% predicted), FEV1 1.86 (56% predicted), FEV1/FVC 54%. DLCO 25.04 (74% predicted).   Spirometry shows moderate airflow obstruction.  Diffusion capacity is mildly reduced.  In 2015 FVC was 4.16, FEV1 2.34, DLCO 27.93.      Pulse Oximetry while ambulating    Ref Range & Units 14:33   SpO2 % 93    Comment: resting on room air HR 79   SpO2 % 88    Comment: walking on room air 290ft HR 97   SpO2 % 96    Comment: resting on 1L of O2 HR 71   SpO2 % 89    Comment: walking 360ft on 1L O2 HR 97                   Assessment:  1.  COPD with moderate airflow obstruction  2.  Chronic hypoxia  3.  Sleep apnea - on CPAP with oxygen  4.  Obesity    PLAN:  1.  Start anoro inhaler 1 puff daily. Samples provided to patient. Use of device demonstrated. Patient took first dose in clinic with direct observation for proper technique. Patient advised of common side effects of medication.  2.  Continue albuterol every 4 hours as needed  3.  Start oxygen 2 L pulsed during the day  4.  Continue CPAP  at night with 1.5 L oxygen    Follow-up in 2 months or sooner as needed      1. Chronic obstructive pulmonary disease, unspecified COPD type (CMS/HCC) (HCC)    2. Simple chronic bronchitis (CMS/HCC) (HCC)             Pt voices understanding and agreement to plan as stated above. All questions answered.     Total time face to face spent with patient was 40 minutes with more than 50% in counseling and coordination of care regarding the listed diagnoses.    AURELIANO Lewis      A voice recognition program was used to aid in medical record documentation. Sometimes words are printed not exactly as they were spoken. While efforts were made to carefully edit and correct any inaccuracies, some errors may be present. Errors should be taken within the context of the discussion.  Please contact our office if you need assistance interpreting this medical record or notice any mistakes.

## 2019-07-29 DIAGNOSIS — J41.0 SIMPLE CHRONIC BRONCHITIS (CMS/HCC): Primary | ICD-10-CM

## 2019-07-29 DIAGNOSIS — J44.9 CHRONIC OBSTRUCTIVE PULMONARY DISEASE, UNSPECIFIED COPD TYPE (CMS/HCC): ICD-10-CM

## 2019-07-30 NOTE — PROGRESS NOTES
"[facility]  [address] [place] [address]                                           Cardiology Outpatient Follow-up Note    Subjective    Patient ID: Trenton Pulliam is a 73 y.o. male.      HPI  Mr. Pulliam is a 73-year-old gentleman who is a patient of Dr. Vann (formerly Dr. Peña).  He was last evaluated 1 year ago.  Please see problem list below outlining cardiac history.    Trenton is here today for routine follow-up.  Accompanied by his wife.  Continue to spend the winter in [place].  Plan on leaving sometime in October.  He was evaluated by his pulmonologist recently and based on PFTs, his COPD was worse.  It was recommended that he have portable oxygen.  States that today he had an episode while lifting something heavy where he all of a sudden felt very short of breath.  He was wheezing to catch his breath.  After using his rescue inhaler his symptoms did resolve within minutes.  States that it was a \"severe \"episode\".  He does not engage in regular physical exercise, but stays busy.  Denies any exertional symptoms.  His blood pressure has been well controlled with current medications.  Interestingly, his blood sugars have also been improved lately, even since discontinuing Actos and Janumet.    Management at last visit included an ischemic evaluation with a Cardiolite Lexiscan stress test due to his coronary risk factors.  He was asymptomatic.  This was normal without any evidence of myocardial ischemia.    Cardiology Problem List:      Last updated by Maria De Jesus Saini PA-C 7/30/2019    Dr. Vann    Risk factors:  Hypertension  Hypercholesterolemia  Type 2 diabetes mellitus  Morbid obesity  Family history of early CAD  Prior history of smoking, quit in 1995    Cardiac procedures and testing:  -Lexiscan 9/2018; no evidence of ischemia, EF 75% without wall motion abnormalities    Past Medical History:   Diagnosis Date   • COPD (chronic obstructive pulmonary disease) (CMS/[facility]) " ([facility])    • Coronary artery disease    • Diabetes mellitus (CMS/HCC) (HCC)    • Hypertension    • Sleep apnea, obstructive        Past Surgical History:   Procedure Laterality Date   • COLONOSCOPY  2016    9in large removed    • HERNIA REPAIR  2016       Allergies as of 08/02/2019 - Reviewed 08/02/2019   Allergen Reaction Noted   • Ace inhibitors  05/26/2016   • Azithromycin Anaphylaxis 05/26/2016   • Lisinopril  05/26/2016   • Nitroglycerin Anaphylaxis 05/26/2016   • Losartan-hydrochlorothiazide  11/16/2010       Current Outpatient Medications   Medication Sig Dispense Refill   • ciclopirox (PENLAC) 8 % solution ALEJO TO NAILS D  1   • umeclidinium-vilanterol (ANORO ELLIPTA) 62.5-25 mcg/actuation blister with device Inhale 1 puff daily 3 Inhaler 3   • multivitamin-minerals-lutein tablet TAKE MENS ONE A DAY MULTIVITAMIN BY MOUTH ONCE EVERY DAY     • lancets (freestyle) 28 gauge misc Check blood sugar four times a day or as directed 400 each 3   • omega-3 acid ethyl esters (LOVAZA) 1 gram capsule TAKE 1 CAPSULE TWICE A  capsule 3   • psyllium husk (METAMUCIL ORAL) Take by mouth daily.     • Lactobacillus rhamnosus GG (CULTURELLE) 15 billion cell capsule, sprinkle Take by mouth daily.     • diphenhydramine HCl (SIMPLY SLEEP ORAL) Take by mouth daily.     • TRICOR 145 mg tablet Take 1 tablet by mouth daily.     • pioglitazone (ACTOS) 15 mg tablet Take 15 mg by mouth daily.     • amLODIPine (NORVASC) 10 mg tablet Take 10 mg by mouth daily.     • omeprazole (PriLOSEC) 20 mg tablet,delayed release (DR/EC) Take 20 mg by mouth every evening.     • metoprolol succinate XL (TOPROL-XL) 100 mg 24 hr tablet Take 100 mg by mouth daily.     • pravastatin (PRAVACHOL) 40 mg tablet Take 40 mg by mouth daily.     • ezetimibe (ZETIA) 10 mg tablet Take 10 mg by mouth daily.     • ranitidine (ZANTAC) 150 mg tablet Take 150 mg by mouth 2 (two) times a day.     • furosemide (LASIX) 40 mg tablet Take 20-40 mg by mouth daily.       •  valsartan (DIOVAN) 80 mg tablet Take 80 mg by mouth 2 (two) times a day.     • spironolactone (ALDACTONE) 25 mg tablet Take 25 mg by mouth 2 (two) times a day.     • gabapentin (NEURONTIN) 300 mg capsule Take 300 mg by mouth 2 (two) times a day.     • empagliflozin (JARDIANCE) 25 mg tablet TAKE 1 TABLET DAILY 90 0   • blood sugar diagnostic (PRECISION XTRA TEST) strip Use to test blood glucose three times daily 300 1   • aspirin 81 mg EC tablet Take 81 mg by mouth daily.         No current facility-administered medications for this visit.      Facility-Administered Medications Ordered in Other Visits   Medication Dose Route Frequency Provider Last Rate Last Dose   • sodium chloride flush 20 mL  20 mL intravenous PRN Dragan Vann MD   20 mL at 09/10/18 1259       Family History   Problem Relation Age of Onset   • Diabetes Mother    • Cancer Mother    • Diabetes Father    • Cancer Brother        Social History     Tobacco Use   • Smoking status: Former Smoker     Packs/day: 2.50     Years: 35.00     Pack years: 87.50     Types: Cigarettes     Last attempt to quit:      Years since quittin.6   • Smokeless tobacco: Never Used   Substance Use Topics   • Alcohol use: No   • Drug use: No       Review of Systems   Constitution: Positive for malaise/fatigue.   Cardiovascular: Positive for dyspnea on exertion and leg swelling/pain.   Respiratory: Positive for shortness of breath.    Neurological: Positive for dizziness and light-headedness.   All other systems reviewed and are negative.      Objective     Vitals:    19 1346   BP: 120/72   Pulse: 84   SpO2: 91%     Weight: (!) 155 kg (341 lb)  Physical Exam    Constitutional: Well built, obese, nourished, no acute distress.   HEENT: Head is normocephalic and atraumatic. Sclera anicteric.   Neck: Supple. No carotid bruits.  No JVD.  Lungs: Effort normal. Breath sounds are clear without wheezes or rales. No respiratory distress.   Heart: S1-S2, Regular rate and  rhythm without murmur.   Extremities: Without any cyanosis, clubbing, lesions or edema. Radial pulses 2+.  Musculoskeletal: Normal ROM.    Neurologic: No focal deficits.  Psychiartic: cooperative, alert and orientated X 3.  Skin: warm, dry, intact.      Data Review:   Lab Results   Component Value Date     06/18/2019     08/25/2016    K 5.8 06/18/2019    K 4.3 08/25/2016     06/18/2019     08/25/2016    CO2 30.0 06/18/2019    CO2 24 08/25/2016    BUN 34 06/18/2019    BUN 13 08/25/2016    CREATININE 1.9 06/18/2019    CREATININE 1.2 08/25/2016    GLUCOSE 149 06/18/2019    GLUCOSE 125 (H) 08/25/2016    CALCIUM 9.8 06/18/2019    CALCIUM 8.6 08/25/2016     Lab Results   Component Value Date    TROPONINI <0.070 05/26/2016    BNP 8 05/26/2016     Lab Results   Component Value Date    WBC 7.5 06/18/2019    WBC 9.1 08/26/2016    HGB 15.2 06/18/2019    HGB 14.0 08/26/2016    HCT 46.3 06/18/2019    HCT 43.4 08/26/2016    MCV 87.7 06/18/2019    MCV 88.5 08/26/2016    PLT  06/18/2019      Comment:      Unable to Collect - see scanned document     08/26/2016     Lab Results   Component Value Date    CHOL 142 06/18/2019    TRIG 191 06/18/2019    HDL 41 06/18/2019     No results found for: TSH    Assessment/Plan   Diagnoses and all orders for this visit:    Essential hypertension    Mixed hyperlipidemia    Controlled type 2 diabetes mellitus without complication, without long-term current use of insulin (CMS/[facility]) ([facility])    Severe obesity (BMI >= 40) (CMS/[facility]) ([facility])      Trenton continues to do well from a cardiac standpoint.  Denies signs or symptoms of angina, heart failure, or dysrhythmias.  His episode yesterday consistent with acute bronchospasm and COPD.  It has been recommended by his pulmonologist that he have portable oxygen.  On a regular basis, no anginal symptoms.  He had a stress test 1 year ago which was negative for myocardial ischemia.  His blood pressure is well controlled.  Recommended  that he increase his activity, aiming for a goal of at least 30 minutes of sustained aerobic exercise 5 days/week.  Weight loss recommended.  Lipids managed per PCP.  Follow-up 1 year, sooner if needed.      There are no Patient Instructions on file for this visit.    Return in about 1 year (around 8/2/2020), or if symptoms worsen or fail to improve.    The patient verbalized correct understanding and agreement to today's instructions and plan.  The patient verbalized that all questions have been addressed.    Electronically signed by: AURELIANO Gray  8/2/2019  2:24 PM    A voice recognition program was used to aid in documentation of this record. Sometimes words are not printed exactly as they were spoken. While efforts were made to carefully edit and correct any inaccuracies, some errors may be present; please take these into context. Please contact the provider if errors are identified.

## 2019-08-02 ENCOUNTER — OFFICE VISIT (OUTPATIENT)
Dept: CARDIOLOGY | Facility: CLINIC | Age: 74
End: 2019-08-02
Payer: MEDICARE

## 2019-08-02 VITALS
HEART RATE: 84 BPM | WEIGHT: 315 LBS | BODY MASS INDEX: 44.1 KG/M2 | DIASTOLIC BLOOD PRESSURE: 72 MMHG | SYSTOLIC BLOOD PRESSURE: 120 MMHG | OXYGEN SATURATION: 91 % | HEIGHT: 71 IN

## 2019-08-02 DIAGNOSIS — E66.01 SEVERE OBESITY (BMI >= 40) (CMS/HCC): ICD-10-CM

## 2019-08-02 DIAGNOSIS — E78.2 MIXED HYPERLIPIDEMIA: ICD-10-CM

## 2019-08-02 DIAGNOSIS — I10 ESSENTIAL HYPERTENSION: Primary | ICD-10-CM

## 2019-08-02 DIAGNOSIS — E11.9 CONTROLLED TYPE 2 DIABETES MELLITUS WITHOUT COMPLICATION, WITHOUT LONG-TERM CURRENT USE OF INSULIN (CMS/HCC): ICD-10-CM

## 2019-08-02 PROCEDURE — G0463 HOSPITAL OUTPT CLINIC VISIT: HCPCS | Mod: PO | Performed by: PHYSICIAN ASSISTANT

## 2019-08-02 PROCEDURE — 99214 OFFICE O/P EST MOD 30 MIN: CPT | Performed by: PHYSICIAN ASSISTANT

## 2019-08-02 PROCEDURE — G0463 HOSPITAL OUTPT CLINIC VISIT: HCPCS | Performed by: PHYSICIAN ASSISTANT

## 2019-08-02 ASSESSMENT — ENCOUNTER SYMPTOMS
LIGHT-HEADEDNESS: 1
SHORTNESS OF BREATH: 1
DIZZINESS: 1

## 2019-08-02 ASSESSMENT — PAIN SCALES - GENERAL: PAINLEVEL: 0-NO PAIN

## 2019-08-14 ENCOUNTER — HOSPITAL ENCOUNTER (OUTPATIENT)
Dept: CARDIAC REHAB | Facility: HOSPITAL | Age: 74
Discharge: STILL ADMITTED OR ADMINISTRATIVE | End: 2019-08-14
Payer: MEDICARE

## 2019-08-14 VITALS — HEIGHT: 72 IN | BODY MASS INDEX: 42.66 KG/M2 | WEIGHT: 315 LBS | HEART RATE: 70 BPM

## 2019-08-14 DIAGNOSIS — J44.9 CHRONIC OBSTRUCTIVE PULMONARY DISEASE, UNSPECIFIED COPD TYPE (CMS/HCC): ICD-10-CM

## 2019-08-14 PROCEDURE — G0424 PULMONARY REHAB W EXER: HCPCS

## 2019-08-14 NOTE — CARDIAC/PULMONARY REHAB ITP
[facility] Pulmonary Rehab ITPPatient Name: Trenton Pulliam  Location: [facility] CARDIAC REHAB    PULMONARY REHAB INDIVIDUALIZED TREATMENT PLAN    Sessions approved: # [id number]  GOLD Guideline Classification: GOLD Guideline Classification: 2         PCP  [facility]    Patient Goals are assessed weekly, and the estimated timetable to achieve those outcomes is by the end of the program.    Admission Assessment:    Living Situation: Lives with other adult  Education Level Completed: high school   Current Occupation: retired  Disability due to Lung Disease: Yes  Do You Have Anything That Causes You Stress at Home: no  Do you still travel?: Yes  Travel Length of Time: 6 months  Travel Transportation: car      Coffee or Other Caffiene Drinks: Yes  Amount of Caffiene Per Day: 12 oz  Amount of Water: 16 oz  Type of Diet: diabetic  Changes in Food Intake or Food Pattern: no  Skips Meals: No  Appetite: Good      History of Fallin  Secondary Diagnosis: 15  Ambulatory Aids: 0  Intravenous Therapy/Heparin/Saline Lock: 0  Gait/Transferrin  Mental Status: 0  Shah Fall Risk Score: 15      Assist Device: None      Hours of Sleep per Night: 8  Awaken At Night Due To Dyspnea: No  Number of Pillows: 1  Type of Pillow: foam  Sleep Sitting in Chair: No      Environment : Central heat;Central air;Ceiling fans  Has Pets: No  Lived in Another Part of the Country: Yes  Where: [place]      Home O2: Yes  O2 Intake With Sleep (lpm): 1.5 lpm  Oxygen Flow at Home: Continuous  Patient's O2 System: Portable oxygen concentrator (POC)      Resting Vital Signs  Resting Heart Rate: 70  Resting BP: 114/60  Resting SpO2: 90  O2 Flow Rate (L/min): 0 L/min  Weight: (!) 150 kg (330 lb)       Pulmonary Exercise Session  Pulmonary Rehab Session: Phase II      SGRQ  Symptom 62.3  Activity      67.2   Impact       53.9  Total          53.64       Recovery Vital Signs:  Recovery HR: 72  Recovery BP: 108/58  Recovery SpO2: 91  O2 Flow Rate (L/min): 0  L/min      Patient's Outcome:  Purse Lip Breathing: Coached      Patient Goals:  Patient Goals: Improve weight;Increase endurance/stamina      Session Comment:  Respiratory Comment: he did well, he walked the 6 min for 860 ft with slight SOB      Pulmonary Rehab Treatment  Risk Stratification: Intermediate      Psychosocial:  Psychosocial Identified Needs: Impaired quality of life (QOL)  Psychosocial Assessment: PHQ9  PHQ-9 Total Score: 0      Psychosocial Goals: Verbalizes coping mechanisms  Coping Mechanisms Goal: Met      Psychosocial Education Topics: Benefits of exercise  Patient Verbalizes Understanding: Yes      Psychosocial Reassessment:       Exercise and Fitness:  Exercise and Fitness Identified Need: Deconditioning;No regular exercise  Exercise and Fitness Assessment: Deconditioning;No regular exercise      Exercise and Fitness Goal: Improvement of 6 MWT      Exercise and Fitness Education Topics: Review benefits and core components of exercise;Review how to measure & monitor dyspnea level;Review exercise intensity;Review exercise safety guidelines  Patient Verbalizes Understanding: Yes      Exercise and Fitness Home Prescription:  Progression: as tolerated  Mode: walking      Exercise and Fitness Reassessment:       Medications:   Medication Assessment: Knowledge of meds;Med adherence      Medications Goal: Adherence/knowledge of prescribed meds      Medication Education Topics: Review RX purpose, side effect and importance of compliance      Medications Reassessment:       Inhaled Medications:                 Inhaled Medications Reassessment:       Nutrition/Weight Management:  Nutrtion/Weight Management Identified Need: Overweight  Nutrition/Weight Management Assessment: Role of exercise in weight control      Nutrition/Weight Management Goals: Blood sugar in range  Blood Sugar in Range Goal: Met      Nutrition/Weight Management Education Topics: Eating for good health      Nutrition/Weight Management  Reassessment:       Education:        Education Goals: Effectively partner with Pulmonary Rehab team to prevent and manage disease-related impairments  Effectively Partner with Pulmonary Rehab Team to Prevent and Manage Disease-Related Impairments: Met      Education Topics: Breathing strategies, dyspnea control at rest, with panic, exercise & ADLs;Home exercise program      Education Reassessment:       Hypoxemia:  Hypoxemia Identified Needs: Hypoxemia  Hypoxemia Assessment: Initial SpO2      Hypoxemia Goals: Using O2 as prescribed/safely  Using O2 as Prescribed/Safely Goal: Met      Hypoxemia Education Topics: Appropriate use at rest and activity;Oxygen safety;Monitor SpO2 at rest and activity      Hypoxemia Reassessment:       Activities of Daily Living:       ADLs Goals: ADL management with control of dyspnea      ADL Education Topics: ADL performance with pacing, dyspnea control      Activities of Daily Living Reassessment:       Secretion Clearance:  Secretion Clearance Assessment: Patient reports productive cough daily less than 1 Tbsp      Secretion Clearance Goals: Patient demonstrated effective cough, effective secretion clearance  Patient Demonstrated Effective Cough, Effective Secretion Clearance: Met      Secretion Clearance Education Topics: Role of exercise in secretion clearance      Secretion Clearance Reassessment:       Respiratory Infection:  Respiratory Infection Identified Need: Respiratory infection prevention/management  Respiratory Infection Assessment: Patient reports 0-1/year respiratory infection      Respiratory Infection Goals: Patient describes signs/symptoms of infection, methods to identify & prevent & when to call provider  Patient Describes Signs/Symptoms of Infection, Methods to Identify & Prevent & When to Call Provider: Met      Respiratory Infection Education Topics: Hydration;Hand Hygiene;Evaluate sputum;When to call provider;S/S to report: purulent  sputum/dyspnea/fatigue;Influenza/pneumonia vaccine;Cleaning of respiratory equipment      Respiratory Infection Reassessment:         Ana Melgar  8/14/2019 3:33 PM

## 2019-08-15 LAB — EXTERNAL CREATININE (MG/DL) IN SER/PLAS: 2.2 MG/DL

## 2019-08-19 ENCOUNTER — HOSPITAL ENCOUNTER (OUTPATIENT)
Dept: CARDIAC REHAB | Facility: HOSPITAL | Age: 74
Discharge: STILL ADMITTED OR ADMINISTRATIVE | End: 2019-08-19
Payer: MEDICARE

## 2019-08-19 VITALS — WEIGHT: 315 LBS | BODY MASS INDEX: 44.89 KG/M2

## 2019-08-19 DIAGNOSIS — J44.9 CHRONIC OBSTRUCTIVE PULMONARY DISEASE, UNSPECIFIED COPD TYPE (CMS/HCC): ICD-10-CM

## 2019-08-19 PROCEDURE — G0424 PULMONARY REHAB W EXER: HCPCS

## 2019-08-21 ENCOUNTER — HOSPITAL ENCOUNTER (OUTPATIENT)
Dept: CARDIAC REHAB | Facility: HOSPITAL | Age: 74
Discharge: STILL ADMITTED OR ADMINISTRATIVE | End: 2019-08-21
Payer: MEDICARE

## 2019-08-21 VITALS — WEIGHT: 315 LBS | BODY MASS INDEX: 45.03 KG/M2

## 2019-08-21 DIAGNOSIS — J44.9 CHRONIC OBSTRUCTIVE PULMONARY DISEASE, UNSPECIFIED COPD TYPE (CMS/HCC): ICD-10-CM

## 2019-08-21 PROCEDURE — G0424 PULMONARY REHAB W EXER: HCPCS

## 2019-08-23 ENCOUNTER — OFFICE VISIT (OUTPATIENT)
Dept: NEPHROLOGY | Facility: CLINIC | Age: 74
End: 2019-08-23
Payer: MEDICARE

## 2019-08-23 VITALS
WEIGHT: 315 LBS | DIASTOLIC BLOOD PRESSURE: 61 MMHG | SYSTOLIC BLOOD PRESSURE: 113 MMHG | HEART RATE: 79 BPM | OXYGEN SATURATION: 91 % | BODY MASS INDEX: 44.67 KG/M2

## 2019-08-23 DIAGNOSIS — E87.5 HYPERKALEMIA: ICD-10-CM

## 2019-08-23 DIAGNOSIS — N18.32 CKD STAGE G3B/A1, GFR 30-44 AND ALBUMIN CREATININE RATIO <30 MG/G (CMS/HCC): Primary | ICD-10-CM

## 2019-08-23 DIAGNOSIS — E66.01 SEVERE OBESITY (BMI >= 40) (CMS/HCC): ICD-10-CM

## 2019-08-23 DIAGNOSIS — I10 ESSENTIAL HYPERTENSION: ICD-10-CM

## 2019-08-23 PROCEDURE — 99214 OFFICE O/P EST MOD 30 MIN: CPT | Performed by: INTERNAL MEDICINE

## 2019-08-23 PROCEDURE — G0463 HOSPITAL OUTPT CLINIC VISIT: HCPCS | Mod: PO | Performed by: INTERNAL MEDICINE

## 2019-08-23 ASSESSMENT — ENCOUNTER SYMPTOMS
SHORTNESS OF BREATH: 0
DYSURIA: 0
FATIGUE: 0
VOMITING: 0
UNEXPECTED WEIGHT CHANGE: 0
DIFFICULTY URINATING: 0
LIGHT-HEADEDNESS: 0
HEMATURIA: 0
CHEST TIGHTNESS: 0
NAUSEA: 0

## 2019-08-23 ASSESSMENT — PAIN SCALES - GENERAL: PAINLEVEL: 0-NO PAIN

## 2019-08-23 NOTE — LETTER
MEDICAL CLINIC NEPHROLOGY  [address] [facility] [place] [address]  [phone]  Dept: [phone]    August 23, 2019     [facility] [place]  [address] [place] [address]    Patient: Trenton Pulliam   YOB: 1945   Date of Visit: 8/23/2019       Dear Dr. Fof[place]:    Thank you for referring Trenton Pulliam to me for evaluation. Below are my notes for this consultation.    If you have questions, please do not hesitate to call me. I look forward to following your patient along with you.         Sincerely,        SANDHYA SANTOS MD        CC: No Recipients  SANDHYA SANTOS MD  8/23/2019 11:59 AM  Signed  Subjective      Patient ID: Trenton Pulliam is a 74 y.o. male.    HPI: Patient is seen with his wife today for follow-up of his stage III chronic kidney disease proteinuria diabetes and hypertension.  He reports that he is feeling well.  He was having an issue with lower blood sugars which is attributed problems with his glucometer.  He is back on his previous agents with the exception of Actos.  He denies any chest pain or shortness of breath.  He has had no lower extremity edema.  He denies any urinary symptoms.    He is noted to have a potassium of 5.8 with his Leticia labs.  He is on a combination of valsartan and spironolactone for blood pressure control.  He is not follow a lower potassium diet.  He denies any trouble related to his medications.  Patient reports that he missed more than half of his 24-hour urine collection the fact that he was not at home at the time of the collection.    The following have been reviewed and updated as appropriate in this visit:  Tobacco  Allergies  Meds  Problems  Med Hx  Surg Hx  Fam Hx       Review of Systems   Constitutional: Negative for fatigue and unexpected weight change.   Respiratory: Negative for chest tightness and shortness of breath.    Cardiovascular: Negative for chest pain and leg swelling.   Gastrointestinal: Negative for nausea and vomiting.    Genitourinary: Negative for difficulty urinating, dysuria, hematuria and urgency.   Neurological: Negative for light-headedness.       Objective   /61 (BP Location: Left arm, Patient Position: Sitting, Cuff Size: Large)   Pulse 79   Wt (!) 149 kg (329 lb 6.4 oz)   SpO2 91%   BMI 44.67 kg/m²      Physical Exam  Vitals signs reviewed.   Constitutional:       General: He is not in acute distress.     Appearance: Normal appearance. He is obese.   HENT:      Head: Normocephalic.      Mouth/Throat:      Mouth: Mucous membranes are moist.   Neck:      Musculoskeletal: Neck supple.   Cardiovascular:      Rate and Rhythm: Normal rate and regular rhythm.      Pulses: Normal pulses.      Heart sounds: Normal heart sounds.   Pulmonary:      Effort: Pulmonary effort is normal.      Breath sounds: Normal breath sounds.   Musculoskeletal:      Right lower leg: No edema.      Left lower leg: No edema.   Skin:     General: Skin is warm and dry.   Neurological:      Mental Status: He is alert and oriented to person, place, and time.   Psychiatric:         Behavior: Behavior normal.       Abstract on 08/21/2019   Component Date Value Ref Range Status   • Creatinine 08/15/2019 2.2  mg/dL Final   • Sodium 06/18/2019 145  mmol/L Final   • Potassium 06/18/2019 5.8  mmol/L Final   • Chloride 06/18/2019 101  mmol/L Final   • CO2 06/18/2019 30  mmol/L Final   • BUN 06/18/2019 34  mg/dL Final   • Creatinine 06/18/2019 1.9  mg/dL Final   • Glucose 06/18/2019 149  mg/dL Final   • Calcium 06/18/2019 9.8  mg/dL Final   • eGFR 06/18/2019 34.9  mL/min/1.73m*2 Final   Orders Only on 08/21/2019   Component Date Value Ref Range Status   • URINE CREATININE CLEARANCE 08/15/2019 44.25   Final   • Creatinine, Ur 08/15/2019 84.3  mg/dL Final   • PTH (Inact Assay), Serum 08/15/2019 33.6   Final   • External Hemoglobin 08/15/2019 15.1  g/dL Final   • External Hematocrit 08/15/2019 45.4  % Final   Office Visit on 07/24/2019   Component Date Value  Ref Range Status   • SpO2 07/24/2019 93  % Final   • SpO2 07/24/2019 88  % Final   • SpO2 07/24/2019 96  % Final   • SpO2 07/24/2019 89  % Final       Assessment/Plan       Diagnosis Plan   1. CKD stage G3b/A1, GFR 30-44 and albumin creatinine ratio <30 mg/g (CMS/[facility]) ([facility])     2. Essential hypertension     3. Severe obesity (BMI >= 40) (CMS/[facility]) ([facility])     4. Hyperkalemia       Blood pressures under adequate control.  Patient will be referred back to the [facility] for follow-up renal function test and a total protein to creatinine ratio.  I if potassium remains elevated I will need to consider discontinuing spironolactone.  Did discuss a lower potassium diet the patient and wife today.  No other adjustments of medications pending follow-up lab data.

## 2019-08-23 NOTE — LETTER
MEDICAL CLINIC NEPHROLOGY  [address] [place] [address]  [phone]  Dept: [phone]  August 23, 2019     Patient: Trenton Pulliam   YOB: 1945   Date of Visit: 8/23/2019       To Whom It May Concern:    It is my medical opinion that Trenton Pulliam {Work release (duty restriction):74955}.    If you have any questions or concerns, please don't hesitate to call.         Sincerely,        SANDHYA SANTOS MD    CC: No Recipients

## 2019-08-23 NOTE — PROGRESS NOTES
Subjective      Patient ID: Trenton Pulliam is a 74 y.o. male.    HPI: Patient is seen with his wife today for follow-up of his stage III chronic kidney disease proteinuria diabetes and hypertension.  He reports that he is feeling well.  He was having an issue with lower blood sugars which is attributed problems with his glucometer.  He is back on his previous agents with the exception of Actos.  He denies any chest pain or shortness of breath.  He has had no lower extremity edema.  He denies any urinary symptoms.    He is noted to have a potassium of 5.8 with his Leticia labs.  He is on a combination of valsartan and spironolactone for blood pressure control.  He is not follow a lower potassium diet.  He denies any trouble related to his medications.  Patient reports that he missed more than half of his 24-hour urine collection the fact that he was not at home at the time of the collection.    The following have been reviewed and updated as appropriate in this visit:  Tobacco  Allergies  Meds  Problems  Med Hx  Surg Hx  Fam Hx       Review of Systems   Constitutional: Negative for fatigue and unexpected weight change.   Respiratory: Negative for chest tightness and shortness of breath.    Cardiovascular: Negative for chest pain and leg swelling.   Gastrointestinal: Negative for nausea and vomiting.   Genitourinary: Negative for difficulty urinating, dysuria, hematuria and urgency.   Neurological: Negative for light-headedness.       Objective   /61 (BP Location: Left arm, Patient Position: Sitting, Cuff Size: Large)   Pulse 79   Wt (!) 149 kg (329 lb 6.4 oz)   SpO2 91%   BMI 44.67 kg/m²     Physical Exam  Vitals signs reviewed.   Constitutional:       General: He is not in acute distress.     Appearance: Normal appearance. He is obese.   HENT:      Head: Normocephalic.      Mouth/Throat:      Mouth: Mucous membranes are moist.   Neck:      Musculoskeletal: Neck supple.   Cardiovascular:      Rate and  Rhythm: Normal rate and regular rhythm.      Pulses: Normal pulses.      Heart sounds: Normal heart sounds.   Pulmonary:      Effort: Pulmonary effort is normal.      Breath sounds: Normal breath sounds.   Musculoskeletal:      Right lower leg: No edema.      Left lower leg: No edema.   Skin:     General: Skin is warm and dry.   Neurological:      Mental Status: He is alert and oriented to person, place, and time.   Psychiatric:         Behavior: Behavior normal.       Abstract on 08/21/2019   Component Date Value Ref Range Status   • Creatinine 08/15/2019 2.2  mg/dL Final   • Sodium 06/18/2019 145  mmol/L Final   • Potassium 06/18/2019 5.8  mmol/L Final   • Chloride 06/18/2019 101  mmol/L Final   • CO2 06/18/2019 30  mmol/L Final   • BUN 06/18/2019 34  mg/dL Final   • Creatinine 06/18/2019 1.9  mg/dL Final   • Glucose 06/18/2019 149  mg/dL Final   • Calcium 06/18/2019 9.8  mg/dL Final   • eGFR 06/18/2019 34.9  mL/min/1.73m*2 Final   Orders Only on 08/21/2019   Component Date Value Ref Range Status   • URINE CREATININE CLEARANCE 08/15/2019 44.25   Final   • Creatinine, Ur 08/15/2019 84.3  mg/dL Final   • PTH (Inact Assay), Serum 08/15/2019 33.6   Final   • External Hemoglobin 08/15/2019 15.1  g/dL Final   • External Hematocrit 08/15/2019 45.4  % Final   Office Visit on 07/24/2019   Component Date Value Ref Range Status   • SpO2 07/24/2019 93  % Final   • SpO2 07/24/2019 88  % Final   • SpO2 07/24/2019 96  % Final   • SpO2 07/24/2019 89  % Final       Assessment/Plan       Diagnosis Plan   1. CKD stage G3b/A1, GFR 30-44 and albumin creatinine ratio <30 mg/g (CMS/[facility]) ([facility])     2. Essential hypertension     3. Severe obesity (BMI >= 40) (CMS/[facility]) ([facility])     4. Hyperkalemia       Blood pressures under adequate control.  Patient will be referred back to the [facility] for follow-up renal function test and a total protein to creatinine ratio.  I if potassium remains elevated I will need to consider discontinuing  spironolactone.  Did discuss a lower potassium diet the patient and wife today.  No other adjustments of medications pending follow-up lab data.

## 2019-08-26 ENCOUNTER — HOSPITAL ENCOUNTER (OUTPATIENT)
Dept: CARDIAC REHAB | Facility: HOSPITAL | Age: 74
Discharge: STILL ADMITTED OR ADMINISTRATIVE | End: 2019-08-26
Payer: MEDICARE

## 2019-08-26 DIAGNOSIS — J44.9 CHRONIC OBSTRUCTIVE PULMONARY DISEASE, UNSPECIFIED COPD TYPE (CMS/HCC): ICD-10-CM

## 2019-08-26 PROCEDURE — G0424 PULMONARY REHAB W EXER: HCPCS

## 2019-08-28 ENCOUNTER — HOSPITAL ENCOUNTER (OUTPATIENT)
Dept: CARDIAC REHAB | Facility: HOSPITAL | Age: 74
Discharge: STILL ADMITTED OR ADMINISTRATIVE | End: 2019-08-28
Payer: MEDICARE

## 2019-08-28 DIAGNOSIS — J44.9 CHRONIC OBSTRUCTIVE PULMONARY DISEASE, UNSPECIFIED COPD TYPE (CMS/HCC): ICD-10-CM

## 2019-08-28 PROCEDURE — G0424 PULMONARY REHAB W EXER: HCPCS

## 2019-08-30 ENCOUNTER — HOSPITAL ENCOUNTER (OUTPATIENT)
Dept: CARDIAC REHAB | Facility: HOSPITAL | Age: 74
Discharge: STILL ADMITTED OR ADMINISTRATIVE | End: 2019-08-30
Payer: MEDICARE

## 2019-08-30 VITALS — WEIGHT: 315 LBS | BODY MASS INDEX: 45.3 KG/M2

## 2019-08-30 DIAGNOSIS — J44.9 CHRONIC OBSTRUCTIVE PULMONARY DISEASE, UNSPECIFIED COPD TYPE (CMS/HCC): ICD-10-CM

## 2019-08-30 PROCEDURE — G0424 PULMONARY REHAB W EXER: HCPCS

## 2019-09-04 ENCOUNTER — HOSPITAL ENCOUNTER (OUTPATIENT)
Dept: CARDIAC REHAB | Facility: HOSPITAL | Age: 74
Discharge: STILL ADMITTED OR ADMINISTRATIVE | End: 2019-09-04
Payer: MEDICARE

## 2019-09-04 VITALS — WEIGHT: 315 LBS | BODY MASS INDEX: 45.3 KG/M2

## 2019-09-04 DIAGNOSIS — J44.9 CHRONIC OBSTRUCTIVE PULMONARY DISEASE, UNSPECIFIED COPD TYPE (CMS/HCC): ICD-10-CM

## 2019-09-04 PROCEDURE — G0424 PULMONARY REHAB W EXER: HCPCS

## 2019-09-09 ENCOUNTER — HOSPITAL ENCOUNTER (OUTPATIENT)
Dept: CARDIAC REHAB | Facility: HOSPITAL | Age: 74
Discharge: STILL ADMITTED OR ADMINISTRATIVE | End: 2019-09-09
Payer: MEDICARE

## 2019-09-09 VITALS — WEIGHT: 315 LBS | BODY MASS INDEX: 45.03 KG/M2

## 2019-09-09 DIAGNOSIS — J44.9 CHRONIC OBSTRUCTIVE PULMONARY DISEASE, UNSPECIFIED COPD TYPE (CMS/HCC): ICD-10-CM

## 2019-09-09 PROCEDURE — G0424 PULMONARY REHAB W EXER: HCPCS

## 2019-09-11 ENCOUNTER — HOSPITAL ENCOUNTER (OUTPATIENT)
Dept: CARDIAC REHAB | Facility: HOSPITAL | Age: 74
Discharge: STILL ADMITTED OR ADMINISTRATIVE | End: 2019-09-11
Payer: MEDICARE

## 2019-09-11 VITALS — WEIGHT: 315 LBS | BODY MASS INDEX: 45.16 KG/M2

## 2019-09-11 DIAGNOSIS — J44.9 CHRONIC OBSTRUCTIVE PULMONARY DISEASE, UNSPECIFIED COPD TYPE (CMS/HCC): ICD-10-CM

## 2019-09-11 PROCEDURE — G0424 PULMONARY REHAB W EXER: HCPCS

## 2019-09-11 NOTE — CARDIAC/PULMONARY REHAB ITP
[facility] Pulmonary Rehab ITPPatient Name: Trenton Pulliam  Location: [facility] CARDIAC REHAB    PULMONARY REHAB INDIVIDUALIZED TREATMENT PLAN    Sessions approved:36  GOLD Guideline Classification: GOLD Guideline Classification: 2         PCP  [facility]    Resting Vital Signs  Resting Heart Rate: 86  Resting BP: 108/58  Resting SpO2: 91  O2 Flow Rate (L/min): 0 L/min  Weight: (!) 151 kg (333 lb)       Pulmonary Exercise Session  Pulmonary Rehab Session: Phase II      Treadmill Exercise Assessment:  Exercise Duration (mins): 15  Speed: 1.3  stGstrstastdstest:st st1st RPE (Modified Carlos): 6  Exercise HR: 94  Exercise SpO2: 88     Nu-Step Exercise Assessment:  Exercise Duration (mins): 20 minutes  RPE (Modified Carlos): 6  Adjusted Workload: 46 Geiger  Exercise HR: 98  Exercise BP: 138/72  Exercise SpO2: 90     Rower Exercise Assessment:  Resistance Level: 4    UBE Excercise Assessment:  Exercise Duration (mins): 10 minutes  RPE (Modified Carlos): 5  Adjusted Workload: 20    Recovery Vital Signs:  Recovery HR: 87  Recovery BP: 122/64  Recovery SpO2: 91  O2 Flow Rate (L/min): 0 L/min    Patient's Outcome:  Purse Lip Breathing: Coached    Patient Goals:  Patient Goals: Improve weight;Increase endurance/stamina    Session Comment:  Respiratory Comment: he did well, he walked the 6 min for 860 ft with slight SOB    Pulmonary Rehab Treatment  Risk Stratification: Intermediate    Psychosocial:  Psychosocial Identified Needs: Impaired quality of life (QOL)  Psychosocial Assessment: PHQ9  PHQ-9 Total Score: 0      Psychosocial Goals: Verbalizes coping mechanisms  Coping Mechanisms Goal: Met      Psychosocial Education Topics: Benefits of exercise  Patient Verbalizes Understanding: Yes      Exercise and Fitness:  Exercise and Fitness Identified Need: Deconditioning;No regular exercise  Exercise and Fitness Assessment: Deconditioning;No regular exercise      Exercise and Fitness Goal: Improvement of 6 MWT      Exercise and Fitness Education  Topics: Review benefits and core components of exercise;Review how to measure & monitor dyspnea level;Review exercise intensity;Review exercise safety guidelines  Patient Verbalizes Understanding: Yes      Exercise and Fitness Home Prescription:  Progression: as tolerated  Mode: walking      Medications:   Medication Assessment: Knowledge of meds;Med adherence      Medications Goal: Adherence/knowledge of prescribed meds      Medication Education Topics: Review RX purpose, side effect and importance of compliance    Nutrition/Weight Management:  Nutrtion/Weight Management Identified Need: Overweight  Nutrition/Weight Management Assessment: Role of exercise in weight control      Nutrition/Weight Management Goals: Blood sugar in range  Blood Sugar in Range Goal: Met      Nutrition/Weight Management Education Topics: Eating for good health      Education Goals: Effectively partner with Pulmonary Rehab team to prevent and manage disease-related impairments  Effectively Partner with Pulmonary Rehab Team to Prevent and Manage Disease-Related Impairments: Met      Education Topics: Breathing strategies, dyspnea control at rest, with panic, exercise & ADLs;Home exercise program    Hypoxemia:  Hypoxemia Identified Needs: Hypoxemia  Hypoxemia Assessment: Initial SpO2      Hypoxemia Goals: Using O2 as prescribed/safely  Using O2 as Prescribed/Safely Goal: Met      Hypoxemia Education Topics: Appropriate use at rest and activity;Oxygen safety;Monitor SpO2 at rest and activity      HAD Goals: ADL management with control of dyspnea      ADL Education Topics: ADL performance with pacing, dyspnea control      Secretion Clearance:  Secretion Clearance Assessment: Patient reports productive cough daily less than 1 Tbsp      Secretion Clearance Goals: Patient demonstrated effective cough, effective secretion clearance  Patient Demonstrated Effective Cough, Effective Secretion Clearance: Met      Secretion Clearance Education  Topics: Role of exercise in secretion clearance      Respiratory Infection Identified Need: Respiratory infection prevention/management  Respiratory Infection Assessment: Patient reports 0-1/year respiratory infection      Respiratory Infection Goals: Patient describes signs/symptoms of infection, methods to identify & prevent & when to call provider  Patient Describes Signs/Symptoms of Infection, Methods to Identify & Prevent & When to Call Provider: Met      Respiratory Infection Education Topics: Hydration;Hand Hygiene;Evaluate sputum;When to call provider;S/S to report: purulent sputum/dyspnea/fatigue;Influenza/pneumonia vaccine;Cleaning of respiratory equipment      Respiratory Infection Reassessment:   Pt has completed 9 sessions of Pulmonary Rehab. Progress has been slow but is making gains. Total exercise duration has reached 45min. PLB reminders are given frequently and sats respond well to this technique      Terry Prabhakar  9/11/2019 3:05 PM

## 2019-09-13 ENCOUNTER — HOSPITAL ENCOUNTER (OUTPATIENT)
Dept: CARDIAC REHAB | Facility: HOSPITAL | Age: 74
Discharge: STILL ADMITTED OR ADMINISTRATIVE | End: 2019-09-13
Payer: MEDICARE

## 2019-09-13 DIAGNOSIS — J44.9 CHRONIC OBSTRUCTIVE PULMONARY DISEASE, UNSPECIFIED COPD TYPE (CMS/HCC): ICD-10-CM

## 2019-09-13 PROCEDURE — G0424 PULMONARY REHAB W EXER: HCPCS

## 2019-09-17 ENCOUNTER — OFFICE VISIT (OUTPATIENT)
Dept: PULMONOLOGY | Facility: CLINIC | Age: 74
End: 2019-09-17
Payer: MEDICARE

## 2019-09-17 VITALS
OXYGEN SATURATION: 92 % | WEIGHT: 315 LBS | BODY MASS INDEX: 45.57 KG/M2 | HEART RATE: 79 BPM | RESPIRATION RATE: 24 BRPM | SYSTOLIC BLOOD PRESSURE: 103 MMHG | DIASTOLIC BLOOD PRESSURE: 84 MMHG

## 2019-09-17 DIAGNOSIS — J44.9 CHRONIC OBSTRUCTIVE PULMONARY DISEASE, UNSPECIFIED COPD TYPE (CMS/HCC): Primary | ICD-10-CM

## 2019-09-17 PROCEDURE — 99214 OFFICE O/P EST MOD 30 MIN: CPT | Performed by: PHYSICIAN ASSISTANT

## 2019-09-17 PROCEDURE — G0463 HOSPITAL OUTPT CLINIC VISIT: HCPCS | Mod: PO | Performed by: PHYSICIAN ASSISTANT

## 2019-09-17 RX ORDER — DICLOFENAC SODIUM 10 MG/G
1 GEL TOPICAL 4 TIMES DAILY
COMMUNITY
End: 2021-06-22 | Stop reason: ALTCHOICE

## 2019-09-17 NOTE — LETTER
[facility] CLINIC PULMONOLOGY  [address] [facility] [place] [address]  [phone]  Dept: [phone]  2019     [facility] [place]  [address]  [place] [address]    Patient: Trenton Pulliam   YOB: 1945   Date of Visit: 2019       To:  [facility] [place]    Our mutual patient, Trenton Pulliam, was seen in my office on 2019. Below are my notes.     AURELIANO Lewis  2019  2:13 PM  Signed    PULMONARY NOTE      HPI:  Trenton Pulliam is a 74 y.o. male patient presents for follow-up regarding COPD.  He was seen in July.  Patient has been using the anoro 1 puff daily.  He states that he does not notice much difference with the inhaler but is happy to use it every day.  He denies side effects from the inhaler.  He used his albuterol inhaler once in early August when he was moving and had acute onset of shortness of breath.  The inhaler was helpful.  Patient has been going to pulmonary rehab.  He states he does not notice any improvement from this but his wife tells me that he has doubled his time on the recumbent bike and the treadmill and has less shortness of breath when doing these activities.  He continues to wear CPAP at night with oxygen.  He is not wearing any oxygen during the day.  The plan to move to [place] permanently next month.  They may establish with a pulmonologist down there.  They do plan to be back here visiting family and made      Review of Systems. Pertinent positives and negatives listed in the HPI.       The following portions of the patient's history were reviewed and updated as appropriate: allergies, current medications, past family history, past medical history, past social history, past surgical history and problem list.    History:  Social History     Tobacco Use   • Smoking status: Former Smoker     Packs/day: 2.50     Years: 35.00     Pack years: 87.50     Types: Cigarettes     Last attempt to quit:      Years since quittin.7   •  Smokeless tobacco: Never Used   Substance Use Topics   • Alcohol use: No   • Drug use: No         Immunizations:  Immunization History   Administered Date(s) Administered   • Influenza TIV (IM) 09/20/2016       Medications:    Current Outpatient Medications:   •  diclofenac sodium (VOLTAREN) 1 % gel, Apply 1 application topically 4 (four) times a day, Disp: , Rfl:   •  ciclopirox (PENLAC) 8 % solution, ALEJO TO NAILS D, Disp: , Rfl: 1  •  umeclidinium-vilanterol (ANORO ELLIPTA) 62.5-25 mcg/actuation blister with device, Inhale 1 puff daily, Disp: 3 Inhaler, Rfl: 3  •  multivitamin-minerals-lutein tablet, TAKE MENS ONE A DAY MULTIVITAMIN BY MOUTH ONCE EVERY DAY, Disp: , Rfl:   •  lancets (freestyle) 28 gauge misc, Check blood sugar four times a day or as directed, Disp: 400 each, Rfl: 3  •  omega-3 acid ethyl esters (LOVAZA) 1 gram capsule, TAKE 1 CAPSULE TWICE A DAY, Disp: 180 capsule, Rfl: 3  •  psyllium husk (METAMUCIL ORAL), Take by mouth daily., Disp: , Rfl:   •  Lactobacillus rhamnosus GG (CULTURELLE) 15 billion cell capsule, sprinkle, Take by mouth daily., Disp: , Rfl:   •  diphenhydramine HCl (SIMPLY SLEEP ORAL), Take by mouth daily., Disp: , Rfl:   •  TRICOR 145 mg tablet, Take 1 tablet by mouth daily., Disp: , Rfl:   •  amLODIPine (NORVASC) 10 mg tablet, Take 10 mg by mouth daily., Disp: , Rfl:   •  omeprazole (PriLOSEC) 20 mg tablet,delayed release (DR/EC), Take 20 mg by mouth every evening., Disp: , Rfl:   •  metoprolol succinate XL (TOPROL-XL) 100 mg 24 hr tablet, Take 100 mg by mouth daily., Disp: , Rfl:   •  pravastatin (PRAVACHOL) 40 mg tablet, Take 40 mg by mouth daily., Disp: , Rfl:   •  ezetimibe (ZETIA) 10 mg tablet, Take 10 mg by mouth daily., Disp: , Rfl:   •  ranitidine (ZANTAC) 150 mg tablet, Take 150 mg by mouth 2 (two) times a day., Disp: , Rfl:   •  furosemide (LASIX) 40 mg tablet, Take 20-40 mg by mouth daily.  , Disp: , Rfl:   •  valsartan (DIOVAN) 80 mg tablet, Take 80 mg by mouth 2 (two)  times a day., Disp: , Rfl:   •  spironolactone (ALDACTONE) 25 mg tablet, Take 25 mg by mouth 2 (two) times a day., Disp: , Rfl:   •  gabapentin (NEURONTIN) 300 mg capsule, Take 300 mg by mouth 2 (two) times a day., Disp: , Rfl:   •  empagliflozin (JARDIANCE) 25 mg tablet, TAKE 1 TABLET DAILY, Disp: 90, Rfl: 0  •  blood sugar diagnostic (PRECISION XTRA TEST) strip, Use to test blood glucose three times daily, Disp: 300, Rfl: 1  •  aspirin 81 mg EC tablet, Take 81 mg by mouth daily.  , Disp: , Rfl:   No current facility-administered medications for this visit.     Facility-Administered Medications Ordered in Other Visits:   •  sodium chloride flush 20 mL, 20 mL, intravenous, PRN, Dragan Vann MD, 20 mL at 09/10/18 1259    Allergies:  Allergies   Allergen Reactions   • Ace Inhibitors      POSSIBLE ANGIOEDEMA   • Azithromycin Anaphylaxis     QUESTIONABLE ALLERGY   • Lisinopril      Other reaction(s): Cough  ANGIOEDEMA   • Nitroglycerin Anaphylaxis     SEIZURES   • Losartan-Hydrochlorothiazide      Other reaction(s): PNEUMONIA, RENAL FAILURE SYNDROME       Objective:  Vitals:    09/17/19 1306   BP: 103/84   BP Location: Left arm   Patient Position: Sitting   Cuff Size: Large   Pulse: 79   Resp: 24   SpO2: 92%   Weight: (!) 152 kg (336 lb)       Physical Exam  Vitals signs and nursing note reviewed.   Constitutional:       General: He is not in acute distress.     Appearance: Normal appearance. He is not ill-appearing.   HENT:      Mouth/Throat:      Mouth: Mucous membranes are moist.      Pharynx: Oropharynx is clear. No oropharyngeal exudate.   Cardiovascular:      Rate and Rhythm: Normal rate and regular rhythm.      Heart sounds: Normal heart sounds. No murmur.   Pulmonary:      Effort: Pulmonary effort is normal. No tachypnea or respiratory distress.      Breath sounds: No decreased breath sounds, wheezing, rhonchi or rales.   Skin:     General: Skin is warm and dry.   Neurological:      Mental Status: He is alert  and oriented to person, place, and time.             Assessment:  1.  COPD with moderate airflow obstruction  2.  Sleep apnea - on CPAP with oxygen  3.  Obesity    PLAN:  1.  Continue anoro 1 puff daily  2.  Continue albuterol inhaler 2 puffs every 4 hours as needed  3.  CPAP at night with oxygen  4.  Discussed with patient that it does sound like pulmonary rehab has been beneficial for him; however, he does not need to be enrolled in the program in order to exercise.  He can exercise on his own.  Encourage patient to continue with daily exercise.    We will see patient back sometime this summer when they are in the area if he does not establish with a pulmonologist in [place] sooner.    1. Chronic obstructive pulmonary disease, unspecified COPD type (CMS/HCC) ([facility])             Pt voices understanding and agreement to plan as stated above. All questions answered.        AURELIANO Lewis      A voice recognition program was used to aid in medical record documentation. Sometimes words are printed not exactly as they were spoken. While efforts were made to carefully edit and correct any inaccuracies, some errors may be present. Errors should be taken within the context of the discussion.  Please contact our office if you need assistance interpreting this medical record or notice any mistakes.                If you have questions, please do not hesitate to call me. I look forward to following your patient along with you.         Sincerely,        AURELIANO Lewis        CC: No Recipients

## 2019-09-17 NOTE — PROGRESS NOTES
PULMONARY NOTE      HPI:  Trenton Pulliam is a 74 y.o. male patient presents for follow-up regarding COPD.  He was seen in July.  Patient has been using the anoro 1 puff daily.  He states that he does not notice much difference with the inhaler but is happy to use it every day.  He denies side effects from the inhaler.  He used his albuterol inhaler once in early August when he was moving and had acute onset of shortness of breath.  The inhaler was helpful.  Patient has been going to pulmonary rehab.  He states he does not notice any improvement from this but his wife tells me that he has doubled his time on the recumbent bike and the treadmill and has less shortness of breath when doing these activities.  He continues to wear CPAP at night with oxygen.  He is not wearing any oxygen during the day.  The plan to move to [place] permanently next month.  They may establish with a pulmonologist down there.  They do plan to be back here visiting family and made      Review of Systems. Pertinent positives and negatives listed in the HPI.       The following portions of the patient's history were reviewed and updated as appropriate: allergies, current medications, past family history, past medical history, past social history, past surgical history and problem list.    History:  Social History     Tobacco Use   • Smoking status: Former Smoker     Packs/day: 2.50     Years: 35.00     Pack years: 87.50     Types: Cigarettes     Last attempt to quit:      Years since quittin.7   • Smokeless tobacco: Never Used   Substance Use Topics   • Alcohol use: No   • Drug use: No         Immunizations:  Immunization History   Administered Date(s) Administered   • Influenza TIV (IM) 2016       Medications:    Current Outpatient Medications:   •  diclofenac sodium (VOLTAREN) 1 % gel, Apply 1 application topically 4 (four) times a day, Disp: , Rfl:   •  ciclopirox (PENLAC) 8 % solution, ALEJO TO NAILS D, Disp: , Rfl: 1  •   umeclidinium-vilanterol (ANORO ELLIPTA) 62.5-25 mcg/actuation blister with device, Inhale 1 puff daily, Disp: 3 Inhaler, Rfl: 3  •  multivitamin-minerals-lutein tablet, TAKE MENS ONE A DAY MULTIVITAMIN BY MOUTH ONCE EVERY DAY, Disp: , Rfl:   •  lancets (freestyle) 28 gauge misc, Check blood sugar four times a day or as directed, Disp: 400 each, Rfl: 3  •  omega-3 acid ethyl esters (LOVAZA) 1 gram capsule, TAKE 1 CAPSULE TWICE A DAY, Disp: 180 capsule, Rfl: 3  •  psyllium husk (METAMUCIL ORAL), Take by mouth daily., Disp: , Rfl:   •  Lactobacillus rhamnosus GG (CULTURELLE) 15 billion cell capsule, sprinkle, Take by mouth daily., Disp: , Rfl:   •  diphenhydramine HCl (SIMPLY SLEEP ORAL), Take by mouth daily., Disp: , Rfl:   •  TRICOR 145 mg tablet, Take 1 tablet by mouth daily., Disp: , Rfl:   •  amLODIPine (NORVASC) 10 mg tablet, Take 10 mg by mouth daily., Disp: , Rfl:   •  omeprazole (PriLOSEC) 20 mg tablet,delayed release (DR/EC), Take 20 mg by mouth every evening., Disp: , Rfl:   •  metoprolol succinate XL (TOPROL-XL) 100 mg 24 hr tablet, Take 100 mg by mouth daily., Disp: , Rfl:   •  pravastatin (PRAVACHOL) 40 mg tablet, Take 40 mg by mouth daily., Disp: , Rfl:   •  ezetimibe (ZETIA) 10 mg tablet, Take 10 mg by mouth daily., Disp: , Rfl:   •  ranitidine (ZANTAC) 150 mg tablet, Take 150 mg by mouth 2 (two) times a day., Disp: , Rfl:   •  furosemide (LASIX) 40 mg tablet, Take 20-40 mg by mouth daily.  , Disp: , Rfl:   •  valsartan (DIOVAN) 80 mg tablet, Take 80 mg by mouth 2 (two) times a day., Disp: , Rfl:   •  spironolactone (ALDACTONE) 25 mg tablet, Take 25 mg by mouth 2 (two) times a day., Disp: , Rfl:   •  gabapentin (NEURONTIN) 300 mg capsule, Take 300 mg by mouth 2 (two) times a day., Disp: , Rfl:   •  empagliflozin (JARDIANCE) 25 mg tablet, TAKE 1 TABLET DAILY, Disp: 90, Rfl: 0  •  blood sugar diagnostic (PRECISION XTRA TEST) strip, Use to test blood glucose three times daily, Disp: 300, Rfl: 1  •  aspirin  81 mg EC tablet, Take 81 mg by mouth daily.  , Disp: , Rfl:   No current facility-administered medications for this visit.     Facility-Administered Medications Ordered in Other Visits:   •  sodium chloride flush 20 mL, 20 mL, intravenous, PRN, Dragan Vann MD, 20 mL at 09/10/18 1259    Allergies:  Allergies   Allergen Reactions   • Ace Inhibitors      POSSIBLE ANGIOEDEMA   • Azithromycin Anaphylaxis     QUESTIONABLE ALLERGY   • Lisinopril      Other reaction(s): Cough  ANGIOEDEMA   • Nitroglycerin Anaphylaxis     SEIZURES   • Losartan-Hydrochlorothiazide      Other reaction(s): PNEUMONIA, RENAL FAILURE SYNDROME       Objective:  Vitals:    09/17/19 1306   BP: 103/84   BP Location: Left arm   Patient Position: Sitting   Cuff Size: Large   Pulse: 79   Resp: 24   SpO2: 92%   Weight: (!) 152 kg (336 lb)       Physical Exam  Vitals signs and nursing note reviewed.   Constitutional:       General: He is not in acute distress.     Appearance: Normal appearance. He is not ill-appearing.   HENT:      Mouth/Throat:      Mouth: Mucous membranes are moist.      Pharynx: Oropharynx is clear. No oropharyngeal exudate.   Cardiovascular:      Rate and Rhythm: Normal rate and regular rhythm.      Heart sounds: Normal heart sounds. No murmur.   Pulmonary:      Effort: Pulmonary effort is normal. No tachypnea or respiratory distress.      Breath sounds: No decreased breath sounds, wheezing, rhonchi or rales.   Skin:     General: Skin is warm and dry.   Neurological:      Mental Status: He is alert and oriented to person, place, and time.             Assessment:  1.  COPD with moderate airflow obstruction  2.  Sleep apnea - on CPAP with oxygen  3.  Obesity    PLAN:  1.  Continue anoro 1 puff daily  2.  Continue albuterol inhaler 2 puffs every 4 hours as needed  3.  CPAP at night with oxygen  4.  Discussed with patient that it does sound like pulmonary rehab has been beneficial for him; however, he does not need to be enrolled in the  program in order to exercise.  He can exercise on his own.  Encourage patient to continue with daily exercise.    We will see patient back sometime this summer when they are in the area if he does not establish with a pulmonologist in [place] sooner.    1. Chronic obstructive pulmonary disease, unspecified COPD type (CMS/HCC) (HCC)             Pt voices understanding and agreement to plan as stated above. All questions answered.        AURELIANO Lewis      A voice recognition program was used to aid in medical record documentation. Sometimes words are printed not exactly as they were spoken. While efforts were made to carefully edit and correct any inaccuracies, some errors may be present. Errors should be taken within the context of the discussion.  Please contact our office if you need assistance interpreting this medical record or notice any mistakes.

## 2019-09-17 NOTE — Clinical Note
MEDICAL CLINIC PULMONOLOGY  [address] [facility] [place] [id number] [phone]  Dept: [phone]  09/17/19      [facility]  [address]  [place] [address]        To: [facility]      Thank you for referring your patient, Trenton Pulliam, to receive care in my office. I have enclosed a summary of the care provided to Trenton on 09/17/19.    Please contact me with any questions you may have regarding the visit.    Sincerely,         AURELIANO Lewis  [address] [facility] [address]  [phone]    CC: No Recipients

## 2019-09-18 ENCOUNTER — HOSPITAL ENCOUNTER (OUTPATIENT)
Dept: CARDIAC REHAB | Facility: HOSPITAL | Age: 74
Discharge: STILL ADMITTED OR ADMINISTRATIVE | End: 2019-09-18
Payer: MEDICARE

## 2019-09-18 VITALS — BODY MASS INDEX: 44.76 KG/M2 | WEIGHT: 315 LBS

## 2019-09-18 DIAGNOSIS — J44.9 CHRONIC OBSTRUCTIVE PULMONARY DISEASE, UNSPECIFIED COPD TYPE (CMS/HCC): ICD-10-CM

## 2019-09-18 PROCEDURE — G0424 PULMONARY REHAB W EXER: HCPCS

## 2019-09-23 ENCOUNTER — HOSPITAL ENCOUNTER (OUTPATIENT)
Dept: CARDIAC REHAB | Facility: HOSPITAL | Age: 74
Discharge: STILL ADMITTED OR ADMINISTRATIVE | End: 2019-09-23
Payer: MEDICARE

## 2019-09-23 DIAGNOSIS — J44.9 CHRONIC OBSTRUCTIVE PULMONARY DISEASE, UNSPECIFIED COPD TYPE (CMS/HCC): ICD-10-CM

## 2019-09-23 PROCEDURE — G0424 PULMONARY REHAB W EXER: HCPCS

## 2019-09-27 NOTE — CARDIAC/PULMONARY REHAB ITP
[facility] Pulmonary Rehab ITPPatient Name: Trenton Pulliam  Location: [facility] CARDIAC REHAB    PULMONARY REHAB INDIVIDUALIZED TREATMENT PLAN    Sessions approved: # [id number]  GOLD Guideline Classification: GOLD Guideline Classification: 2         PCP  [facility]    Patient Goals are assessed weekly, and the estimated timetable to achieve those outcomes is by the end of the program.      Resting Vital Signs  Resting Heart Rate: 86  Resting BP: 108/50  Resting SpO2: 91  O2 Flow Rate (L/min): 1.5 L/min  Weight: (!) 150 kg (330 lb)       Pulmonary Exercise Session  Pulmonary Rehab Session: Phase II      6 Minute Walk:  O2 Delivery Interface: CPAP/Bi-PAP mask      Treadmill Exercise Assessment:  Exercise Duration (mins): 10  Speed: 1.3  stGstrstastdstest:st st1st RPE (Modified Carlos): 6  Exercise HR: 94  Exercise SpO2: 88       Nu-Step Exercise Assessment:  Exercise Duration (mins): 15 minutes  RPE (Modified Carlos): 5  Adjusted Workload: 46 Geiger  Exercise HR: 102  Exercise BP: 114/60  Exercise SpO2: 90       Rower Exercise Assessment:  Resistance Level: 4      UBE Excercise Assessment:  Exercise Duration (mins): 10 minutes  RPE (Modified Carlos): 5  Adjusted Workload: 20    Recovery Vital Signs:  Recovery HR: 84  Recovery BP: 104/68  Recovery SpO2: 92  O2 Flow Rate (L/min): 0 L/min      Patient's Outcome:  Purse Lip Breathing: Coached      Patient Goals:  Patient Goals: Improve weight;Increase endurance/stamina      Session Comment:  Respiratory Comment: he did well, he walked the 6 min for 860 ft with slight SOB      Pulmonary Rehab Treatment  Risk Stratification: Intermediate      Psychosocial:  Psychosocial Identified Needs: Impaired quality of life (QOL)  Psychosocial Assessment: PHQ9  PHQ-9 Total Score: 0      Psychosocial Goals: Verbalizes coping mechanisms  Coping Mechanisms Goal: Met      Psychosocial Education Topics: Benefits of exercise  Patient Verbalizes Understanding: Yes      Psychosocial Reassessment:       Exercise and  Fitness:  Exercise and Fitness Identified Need: Deconditioning  Exercise and Fitness Assessment: Deconditioning      Exercise and Fitness Goal: Improvement of 6 MWT      Exercise and Fitness Education Topics: Review benefits and core components of exercise  Patient Verbalizes Understanding: Yes      Exercise and Fitness Home Prescription:  Progression: as tolerated   Mode: walk      Exercise and Fitness Reassessment:       Medications:   Medication Assessment: Knowledge of meds      Medications Goal: Adherence/knowledge of prescribed meds      Medication Education Topics: Review RX purpose, side effect and importance of compliance      Medications Reassessment:     Nutrition/Weight Management:  Nutrtion/Weight Management Identified Need: Overweight  Nutrition/Weight Management Assessment: Role of exercise in weight control      Nutrition/Weight Management Goals: Blood sugar in range  Blood Sugar in Range Goal: Met      Nutrition/Weight Management Education Topics: Eating for good health      Education Goals: Effectively partner with Pulmonary Rehab team to prevent and manage disease-related impairments  Effectively Partner with Pulmonary Rehab Team to Prevent and Manage Disease-Related Impairments: Met      Education Topics: Breathing strategies, dyspnea control at rest, with panic, exercise & ADLs;Home exercise program    Hypoxemia:  Hypoxemia Identified Needs: Hypoxemia  Hypoxemia Assessment: Initial SpO2      Hypoxemia Goals: Using O2 as prescribed/safely  Using O2 as Prescribed/Safely Goal: Met      Hypoxemia Education Topics: Appropriate use at rest and activity;Oxygen safety;Monitor SpO2 at rest and activity    ADLs Goals: ADL management with control of dyspnea      ADL Education Topics: ADL performance with pacing, dyspnea control      Secretion Clearance:  Secretion Clearance Assessment: Patient reports productive cough daily less than 1 Tbsp      Secretion Clearance Goals: Patient demonstrated effective  cough, effective secretion clearance  Patient Demonstrated Effective Cough, Effective Secretion Clearance: Met      Secretion Clearance Education Topics: Role of exercise in secretion clearance      Respiratory Infection:  Respiratory Infection Identified Need: Respiratory infection prevention/management  Respiratory Infection Assessment: Patient reports 0-1/year respiratory infection      Respiratory Infection Goals: Patient describes signs/symptoms of infection, methods to identify & prevent & when to call provider  Patient Describes Signs/Symptoms of Infection, Methods to Identify & Prevent & When to Call Provider: Met      Respiratory Infection Education Topics: Hydration;Hand Hygiene;Evaluate sputum;When to call provider;S/S to report: purulent sputum/dyspnea/fatigue;Influenza/pneumonia vaccine;Cleaning of respiratory equipment      iD Raymond  9/27/2019 11:56 AM

## 2019-10-03 ENCOUNTER — OFFICE VISIT (OUTPATIENT)
Dept: ENDOCRINOLOGY | Facility: CLINIC | Age: 74
End: 2019-10-03
Payer: MEDICARE

## 2019-10-03 VITALS
SYSTOLIC BLOOD PRESSURE: 121 MMHG | HEIGHT: 72 IN | HEART RATE: 77 BPM | OXYGEN SATURATION: 90 % | BODY MASS INDEX: 42.66 KG/M2 | WEIGHT: 315 LBS | DIASTOLIC BLOOD PRESSURE: 64 MMHG

## 2019-10-03 DIAGNOSIS — E78.2 MIXED HYPERLIPIDEMIA: ICD-10-CM

## 2019-10-03 DIAGNOSIS — E66.01 SEVERE OBESITY (BMI >= 40) (CMS/HCC): ICD-10-CM

## 2019-10-03 DIAGNOSIS — E11.65 UNCONTROLLED TYPE 2 DIABETES MELLITUS WITH HYPERGLYCEMIA (CMS/HCC): Primary | ICD-10-CM

## 2019-10-03 DIAGNOSIS — I10 ESSENTIAL HYPERTENSION: ICD-10-CM

## 2019-10-03 DIAGNOSIS — N18.32 CKD STAGE G3B/A1, GFR 30-44 AND ALBUMIN CREATININE RATIO <30 MG/G (CMS/HCC): ICD-10-CM

## 2019-10-03 PROBLEM — G62.9 NEUROPATHY: Status: RESOLVED | Noted: 2018-08-24 | Resolved: 2019-10-03

## 2019-10-03 LAB — HGB A1C (AMB): 7.2 % (ref 4.8–6)

## 2019-10-03 PROCEDURE — G0463 HOSPITAL OUTPT CLINIC VISIT: HCPCS | Mod: PO | Performed by: INTERNAL MEDICINE

## 2019-10-03 PROCEDURE — 99214 OFFICE O/P EST MOD 30 MIN: CPT | Performed by: INTERNAL MEDICINE

## 2019-10-03 PROCEDURE — 83036 HEMOGLOBIN GLYCOSYLATED A1C: CPT | Mod: QW,PO | Performed by: INTERNAL MEDICINE

## 2019-10-03 RX ORDER — PIOGLITAZONEHYDROCHLORIDE 15 MG/1
15 TABLET ORAL DAILY
Qty: 30 TABLET | Refills: 0 | Status: SHIPPED | OUTPATIENT
Start: 2019-10-03 | End: 2020-03-31

## 2019-10-03 RX ORDER — PIOGLITAZONEHYDROCHLORIDE 15 MG/1
15 TABLET ORAL DAILY
Qty: 90 TABLET | Refills: 3 | Status: SHIPPED | OUTPATIENT
Start: 2019-10-03 | End: 2020-02-10 | Stop reason: SDUPTHER

## 2019-10-03 ASSESSMENT — PAIN SCALES - GENERAL: PAINLEVEL: 0-NO PAIN

## 2019-10-03 ASSESSMENT — ENCOUNTER SYMPTOMS
EYE REDNESS: 0
NUMBNESS: 0
UNEXPECTED WEIGHT CHANGE: 0
VOMITING: 0
HEADACHES: 0
JOINT SWELLING: 0
POLYDIPSIA: 0
PALPITATIONS: 0
FATIGUE: 0
ABDOMINAL PAIN: 0
APPETITE CHANGE: 0
TROUBLE SWALLOWING: 0
SLEEP DISTURBANCE: 0
CHILLS: 0
DECREASED CONCENTRATION: 0
BLOOD IN STOOL: 0
FREQUENCY: 0
WEAKNESS: 0
SORE THROAT: 0
CHEST TIGHTNESS: 0
BRUISES/BLEEDS EASILY: 0
DIARRHEA: 0
CHOKING: 0
FEVER: 0
COUGH: 0
ARTHRALGIAS: 0
BACK PAIN: 0
NECK PAIN: 0
DYSPHORIC MOOD: 0
NERVOUS/ANXIOUS: 0
FLANK PAIN: 0
WOUND: 0
DIFFICULTY URINATING: 0
CONSTIPATION: 0
DIZZINESS: 0
SHORTNESS OF BREATH: 1
EYE PAIN: 0
MYALGIAS: 0
HEMATURIA: 0
ABDOMINAL DISTENTION: 0
VOICE CHANGE: 0
POLYPHAGIA: 0
DYSURIA: 0
WHEEZING: 0

## 2019-10-03 NOTE — PROGRESS NOTES
PROGRESS NOTE     Subjective   Trenton Pulliam is a 74 y.o. male with type 2 diabetes, which he has had for about 20 years. His past medical history is also significant for hyperlipidemia, hypertension & CKD stage 3b, he uses cpAP and oxygen at night. Diabetes complicted by neuropathy. Currently taking Janumet 50/1000mg daily, Jardiance 25mg daily & Actos 15mg daily for diabetes management. He is adamant about not trying any injectable meds.  A1c 7.2%    Trenton checks blood sugars 1 times daily. Fasting readings 150's in am. Denies any hypoglycemia since his last visit, stating the lowest his blood sugar gets is in the 90. [facility]. [facility]. He is limited by exercise - trying to exercise.   Labs from  pravachol 40 mg and zetia 10 mg and tricor 145 mg. He is on fish oil.  He had blood work done June 2019 total cholesterol 142 LDL 62.8 triglycerides 191 HDL 41.  GFR 34.8.  Glucose 29.  BUN/creatinine 26/1.9.BP is 120/61 -early on amlodipine 10 mg, Spironactone 25 mg, valsartan 80 mg, Toprol 100 mg. He is on lasix 40 mg.    Comprehensive 14 point review of systems is otherwise unremarkable except what has been mentioned in the history of present illness  Currently Taking Medications:  Outpatient Medications Marked as Taking for the 10/3/19 encounter (Office Visit) with Leah Venegas MD   Medication Indication(s) Sig   • diclofenac sodium (VOLTAREN) 1 % gel   Apply 1 application topically 4 (four) times a day   • ciclopirox (PENLAC) 8 % solution   ALEJO TO NAILS D   • umeclidinium-vilanterol (ANORO ELLIPTA) 62.5-25 mcg/actuation blister with device   Inhale 1 puff daily   • multivitamin-minerals-lutein tablet   TAKE MENS ONE A DAY MULTIVITAMIN BY MOUTH ONCE EVERY DAY   • lancets (freestyle) 28 gauge misc   Check blood sugar four times a day or as directed   • omega-3 acid ethyl esters (LOVAZA) 1 gram capsule   TAKE 1 CAPSULE  TWICE A DAY   • psyllium husk (METAMUCIL ORAL)   Take by mouth daily.   • Lactobacillus rhamnosus GG (CULTURELLE) 15 billion cell capsule, sprinkle   Take by mouth daily.   • diphenhydramine HCl (SIMPLY SLEEP ORAL)   Take by mouth daily.   • TRICOR 145 mg tablet   Take 1 tablet by mouth daily.   • amLODIPine (NORVASC) 10 mg tablet   Take 10 mg by mouth daily.   • omeprazole (PriLOSEC) 20 mg tablet,delayed release (DR/EC)   Take 20 mg by mouth every evening.   • metoprolol succinate XL (TOPROL-XL) 100 mg 24 hr tablet   Take 100 mg by mouth daily.   • pravastatin (PRAVACHOL) 40 mg tablet   Take 40 mg by mouth daily.   • ezetimibe (ZETIA) 10 mg tablet   Take 10 mg by mouth daily.   • ranitidine (ZANTAC) 150 mg tablet   Take 150 mg by mouth 2 (two) times a day.   • furosemide (LASIX) 40 mg tablet   Take 20-40 mg by mouth daily.     • valsartan (DIOVAN) 80 mg tablet   Take 80 mg by mouth 2 (two) times a day.   • spironolactone (ALDACTONE) 25 mg tablet   Take 25 mg by mouth 2 (two) times a day.   • gabapentin (NEURONTIN) 300 mg capsule   Take 300 mg by mouth 2 (two) times a day.   • empagliflozin (JARDIANCE) 25 mg tablet   TAKE 1 TABLET DAILY   • blood sugar diagnostic (PRECISION XTRA TEST) strip   Use to test blood glucose three times daily   • aspirin 81 mg EC tablet   Take 81 mg by mouth daily.         Allergies:  Allergies   Allergen Reactions   • Ace Inhibitors      POSSIBLE ANGIOEDEMA   • Azithromycin Anaphylaxis     QUESTIONABLE ALLERGY   • Lisinopril      Other reaction(s): Cough  ANGIOEDEMA   • Nitroglycerin Anaphylaxis     SEIZURES   • Losartan-Hydrochlorothiazide      Other reaction(s): PNEUMONIA, RENAL FAILURE SYNDROME       History Review:  Past Medical History:   Diagnosis Date   • COPD (chronic obstructive pulmonary disease) (CMS/HCC) ([facility])    • Coronary artery disease    • Diabetes mellitus (CMS/HCC) ([facility])    • Hyperkalemia 8/23/2019   • Hypertension    • Sleep apnea, obstructive        Past Surgical  History:   Procedure Laterality Date   • COLONOSCOPY      9in large removed    • HERNIA REPAIR  2016       Social History     Socioeconomic History   • Marital status:      Spouse name: Not on file   • Number of children: Not on file   • Years of education: Not on file   • Highest education level: Not on file   Occupational History   • Not on file   Social Needs   • Financial resource strain: Not on file   • Food insecurity:     Worry: Not on file     Inability: Not on file   • Transportation needs:     Medical: Not on file     Non-medical: Not on file   Tobacco Use   • Smoking status: Former Smoker     Packs/day: 2.50     Years: 35.00     Pack years: 87.50     Types: Cigarettes     Last attempt to quit:      Years since quittin.7   • Smokeless tobacco: Never Used   Substance and Sexual Activity   • Alcohol use: No   • Drug use: No   • Sexual activity: Defer   Lifestyle   • Physical activity:     Days per week: Not on file     Minutes per session: Not on file   • Stress: Not on file   Relationships   • Social connections:     Talks on phone: Not on file     Gets together: Not on file     Attends Protestant service: Not on file     Active member of club or organization: Not on file     Attends meetings of clubs or organizations: Not on file     Relationship status: Not on file   • Intimate partner violence:     Fear of current or ex partner: Not on file     Emotionally abused: Not on file     Physically abused: Not on file     Forced sexual activity: Not on file   Other Topics Concern   • Not on file   Social History Narrative   • Not on file       Family History   Problem Relation Age of Onset   • Diabetes Mother    • Cancer Mother    • Diabetes Father    • Cancer Brother        Review of Systems   Constitutional: Negative for appetite change, chills, fatigue, fever and unexpected weight change.             HENT: Negative for ear pain, hearing loss, sore throat, trouble swallowing and voice  change.    Eyes: Negative for pain, redness and visual disturbance.   Respiratory: Positive for shortness of breath. Negative for cough, choking, chest tightness and wheezing.    Cardiovascular: Negative for chest pain and palpitations.   Gastrointestinal: Negative for abdominal distention, abdominal pain, blood in stool, constipation, diarrhea and vomiting.   Endocrine: Negative for cold intolerance, heat intolerance, polydipsia, polyphagia and polyuria.   Genitourinary: Negative for difficulty urinating, dysuria, flank pain, frequency, hematuria and urgency.   Musculoskeletal: Negative for arthralgias, back pain, gait problem, joint swelling, myalgias and neck pain.   Skin: Negative for rash and wound.   Neurological: Negative for dizziness, syncope, weakness, numbness and headaches.   Hematological: Does not bruise/bleed easily.   Psychiatric/Behavioral: Negative for behavioral problems, decreased concentration, dysphoric mood and sleep disturbance. The patient is not nervous/anxious.          Objective   /64 (BP Location: Left arm, Patient Position: Sitting, Cuff Size: Large)   Pulse 77   Ht 1.829 m (6')   Wt (!) 150 kg (330 lb 9.6 oz)   SpO2 90%   BMI 44.84 kg/m²       Physical Exam  GENERAL:  Alert and oriented x3, in no apparent distress. Obese.  HEENT:  Normocephalic, atraumatic.  There is no lid lag or proptosis noted.  Sclerae are not injected.  NECK:  Thyroid is smooth, nontender, not enlarged.  No nodularities appreciated.  No bruits auscultated.  CARDIOVASCULAR:  Regular rate and rhythm.  S1, S2 are heard.  No rubs, gallops or murmurs are appreciated.  RESPIRATORY:  Lungs clear to auscultation bilaterally without rales, rhonchi or wheezing.  EXTREMITIES:  Show no clubbing, cyanosis, edema, or tremor.  NEUROLOGIC:  There is no focal deficit.  Gait is normal. DTR 1+   PSYCHIATRIC:  The patient is alert and oriented.  Mood and affect are appropriate.  SKIN:  Without significant rash or  lesion.    Lab Review:  Lab Results   Component Value Date    HGBA1C 7.2 (A) 10/03/2019     Lab Results   Component Value Date    CHOL 142 06/18/2019    CHOL 131 07/12/2018     Lab Results   Component Value Date    HDL 41 06/18/2019    HDL 39 07/12/2018     Lab Results   Component Value Date    LDLCALC 62.8 06/18/2019    LDLCALC 48.6 07/12/2018     Lab Results   Component Value Date    TRIG 191 06/18/2019    TRIG 217 07/12/2018     Lab Results   Component Value Date    MICROALBUR 0.7 07/12/2018     No results found for: TSH  Lab Results   Component Value Date    GLUCOSE 189 08/30/2019    CALCIUM 9.0 08/30/2019     08/30/2019    K 4.2 08/30/2019    CO2 23.00 08/30/2019     08/30/2019    BUN 26 08/30/2019    CREATININE 1.9 08/30/2019    ANIONGAP 11.0 08/25/2016           Assessment/Plan   Trenton was seen today for diabetes type ii.    Diagnoses and all orders for this visit:    Controlled type 2 diabetes mellitus without complication, without long-term current use of insulin (CMS/[facility]) ([facility])  -     POCT Glycosylated Hemoglobin (Hb A1C)           1.  Type 2 diabetes without complications: The patient's A1c today 7.2%.  His diabetes is taken turn for the worse.  He has not been as active.  He states that he is going to [place] soon and will start walking and watching his diet.  We will reinitiate Actos 15 mg to his regimen of Janumet 50/1000 daily, Januvia 25 mg.  Patient was encouraged to stay hydrated.  While he is in [place] he will be in contact with me regarding his blood sugar readings.  I like to see his A1c lower than 7 at the next visit.  Of note he is very resistant to taking any injectable medicines.      In regard to complications - he has no retinopathy no nephropathy and no neuropathy.  [facility] The patient does see nephrology and his chronic kidney disease stage IIIb.  We did have a chance to review his labs.  He is on aspirin 81 mg.    2.  Hypertension:  controlled. he is on multiple agents. GFR 34.8. He is on numerous agents including many diuretics.     3.  Mixed hyperlipidemia: His LDL is at goal less than 100.  He is on Pravachol 40 mg, Zetia 10 mg. He is also on fenofibrate and fish oil.         A voice recognition program was used to aid in documentation of this record. Sometimes words are not printed exactly as they were spoken.  While efforts were made to carefully edit and correct any inaccuracies, some errors may be present; please take these into context.  Please contact the provider if areas are identified            No follow-ups on file.

## 2019-10-17 DIAGNOSIS — E78.2 MIXED HYPERLIPIDEMIA: ICD-10-CM

## 2019-10-17 RX ORDER — OMEGA-3-ACID ETHYL ESTERS 1 G/1
CAPSULE, LIQUID FILLED ORAL
Qty: 180 CAPSULE | Refills: 0 | Status: SHIPPED | OUTPATIENT
Start: 2019-10-17 | End: 2020-01-15

## 2020-01-15 DIAGNOSIS — E78.2 MIXED HYPERLIPIDEMIA: ICD-10-CM

## 2020-01-15 RX ORDER — OMEGA-3-ACID ETHYL ESTERS 1 G/1
CAPSULE, LIQUID FILLED ORAL
Qty: 180 CAPSULE | Refills: 3 | Status: SHIPPED | OUTPATIENT
Start: 2020-01-15

## 2020-02-08 ENCOUNTER — PATIENT MESSAGE (OUTPATIENT)
Dept: ENDOCRINOLOGY | Facility: CLINIC | Age: 75
End: 2020-02-08

## 2020-02-08 DIAGNOSIS — E11.65 UNCONTROLLED TYPE 2 DIABETES MELLITUS WITH HYPERGLYCEMIA (CMS/HCC): ICD-10-CM

## 2020-02-10 RX ORDER — PIOGLITAZONEHYDROCHLORIDE 15 MG/1
15 TABLET ORAL DAILY
Qty: 90 TABLET | Refills: 1 | Status: SHIPPED | OUTPATIENT
Start: 2020-02-10 | End: 2020-08-08

## 2020-06-26 DIAGNOSIS — R80.9 PROTEINURIA, UNSPECIFIED TYPE: ICD-10-CM

## 2020-06-26 DIAGNOSIS — N18.32 CKD STAGE G3B/A1, GFR 30-44 AND ALBUMIN CREATININE RATIO <30 MG/G (CMS/HCC): Primary | ICD-10-CM

## 2020-06-30 ENCOUNTER — TELEPHONE (OUTPATIENT)
Dept: NEPHROLOGY | Facility: CLINIC | Age: 75
End: 2020-06-30

## 2020-06-30 NOTE — TELEPHONE ENCOUNTER
"Called to schedule [facility] appointment. I spoke with his wife and she stated \"We are still in [place], with COVID it is not likely that we will be traveling back to [place] in the near future if at all. Trenton has an appointment with a Nephrologist down here, and will be seeing him on Thursday.\" Note sent to nurse in regards to pts recall.   "

## 2020-10-01 ENCOUNTER — NEW PATIENT (OUTPATIENT)
Dept: URBAN - METROPOLITAN AREA CLINIC 85 | Facility: CLINIC | Age: 75
End: 2020-10-01
Payer: MEDICARE

## 2020-10-01 DIAGNOSIS — Z79.4 LONG TERM (CURRENT) USE OF INSULIN: ICD-10-CM

## 2020-10-01 PROCEDURE — 92004 COMPRE OPH EXAM NEW PT 1/>: CPT | Performed by: OPHTHALMOLOGY

## 2020-10-01 ASSESSMENT — INTRAOCULAR PRESSURE
OD: 15
OS: 14

## 2020-10-01 ASSESSMENT — KERATOMETRY
OS: 43.63
OD: 43.63

## 2020-10-01 ASSESSMENT — VISUAL ACUITY
OD: 20/20
OS: 20/20

## 2021-03-02 ENCOUNTER — TELEPHONE (OUTPATIENT)
Dept: ENDOCRINOLOGY | Facility: CLINIC | Age: 76
End: 2021-03-02

## 2021-03-02 ENCOUNTER — PATIENT MESSAGE (OUTPATIENT)
Dept: ENDOCRINOLOGY | Facility: CLINIC | Age: 76
End: 2021-03-02

## 2021-03-02 DIAGNOSIS — E11.65 UNCONTROLLED TYPE 2 DIABETES MELLITUS WITH HYPERGLYCEMIA (CMS/HCC): Primary | ICD-10-CM

## 2021-03-02 NOTE — TELEPHONE ENCOUNTER
Patient called the office to let us know that he is concerned with the new internist he is seeing in [place] . He states that his A1c has gone up to 7.4% and they want to put him on insulin, but wants your opinion first. I advised the patient that it is hard for us to give our opinion as we have not seen this patient in over a year. He states understanding but insists that I pass the message on. He also has scheduled an appointment for when he returns home in June. Please advise, thank you!

## 2021-03-02 NOTE — TELEPHONE ENCOUNTER
Alvin villela -    If so nice to hear from you.  I hope [place] is treating you well.    Are you still taking -Janumet 50/1000mg daily, Jardiance 25mg daily & Actos 15mg daily for diabetes management.  At her last visit in the office your A1c was 7.2%.  Have you been walking and exercising.    Going on insulin is definitely an option.  You could also consider a once a week injectable medicine like Trulicity or Ozempic.  That may be an option.    Your blood sugar readings    KOBI VALDOVINOS MD

## 2021-03-11 NOTE — TELEPHONE ENCOUNTER
1.  He needs to reach out to his doctor there.  With his GFR of 24 he should not be on more than Januvia 25 mg  2.  The Jardiance should never be used twice a day.  3.  With his GFR as low as 24 he should not be on more than 10 mg/day    KOBI VALDOVINOS MD

## 2021-03-11 NOTE — TELEPHONE ENCOUNTER
"Called and spoke with pt. Trenton reports taking Janumet  daily, Jardiance 25 mg BID and Actos 15 mg daily. Pt states provider \"knows pt better than to ask\" r/t question of walking/exercising. Pt checking BG fasting daily between 7461-0375, most recent values noted:    3/11/21 136  3/10/21 147  3/9/21 157  3/8/21 146  3/7/21 143  3/6/21 155  3/5/21 147  3/4/21 142  3/3/21 160  3/2/21 133  3/1/21 154    Please advise.  "

## 2021-03-12 NOTE — TELEPHONE ENCOUNTER
Discussed case verbally with provider. Dr. Venegas states pt is to stop janumet; pt is not to be on more than 25 mg Januvia. Pt should also be taking  jardiance at 10 mg daily. Pt was notified of provider statements and was able to verbalize understanding and was also encouraged to speak with internist. Pt declined nurse send short supply Rx to local pharmacy despite request to have jardiance 10 mg and januvia 25 mg sent to mail order [facility]. Provider notified verbally and Rx sent per verbal order.

## 2021-04-30 ENCOUNTER — PATIENT MESSAGE (OUTPATIENT)
Dept: ENDOCRINOLOGY | Facility: CLINIC | Age: 76
End: 2021-04-30

## 2021-04-30 ENCOUNTER — TELEPHONE (OUTPATIENT)
Dept: ENDOCRINOLOGY | Facility: CLINIC | Age: 76
End: 2021-04-30

## 2021-04-30 NOTE — TELEPHONE ENCOUNTER
----- Message from Penelope Casas RN sent at 4/29/2021 12:17 PM MDT -----  Regarding: FW:blood sugars  Contact: [phone]      ----- Message -----  From: Trenton Pulliam  Sent: 4/29/2021   9:44 AM MDT  To: , #  Subject: RE:blood sugars                                  Will Dr. FERNANDEZ want current blood work for my visit with her in June?   If she does she can send me a request for blood work that I can take to our local lab ([facility]) and have done so she will have the results she needs.  I don’t really have anyone back there to do them.      Our mailing address is [address] [place] [id number].    Trenton uPlliam

## 2021-05-03 NOTE — TELEPHONE ENCOUNTER
From: Trenton Pulliam  To: KOBI VALDOVINOS MD  Sent: 4/30/2021 1:20 PM MDT  Subject: Non-Urgent Medical Question    Dr. FERNANDEZ, I will need to have a lab request from you that I can take to the lab here in order to get these tests done.    Trenton Pulliam  [phone]  [address] [place]. [address]

## 2021-05-03 NOTE — TELEPHONE ENCOUNTER
Per telephone encounter 4/30/21 external fasting orders are pended; awaiting pt response from [employer] message sent.

## 2021-06-22 ENCOUNTER — OFFICE VISIT (OUTPATIENT)
Dept: ENDOCRINOLOGY | Facility: CLINIC | Age: 76
End: 2021-06-22
Payer: MEDICARE

## 2021-06-22 VITALS
OXYGEN SATURATION: 91 % | HEART RATE: 79 BPM | SYSTOLIC BLOOD PRESSURE: 120 MMHG | BODY MASS INDEX: 42.66 KG/M2 | HEIGHT: 72 IN | WEIGHT: 315 LBS | DIASTOLIC BLOOD PRESSURE: 69 MMHG

## 2021-06-22 DIAGNOSIS — E11.65 UNCONTROLLED TYPE 2 DIABETES MELLITUS WITH HYPERGLYCEMIA (CMS/HCC): Primary | ICD-10-CM

## 2021-06-22 DIAGNOSIS — I10 ESSENTIAL HYPERTENSION: ICD-10-CM

## 2021-06-22 DIAGNOSIS — E66.01 SEVERE OBESITY (BMI >= 40) (CMS/HCC): ICD-10-CM

## 2021-06-22 DIAGNOSIS — E78.2 MIXED HYPERLIPIDEMIA: ICD-10-CM

## 2021-06-22 LAB — HGB A1C (AMB): 7.1 % (ref 4.8–6)

## 2021-06-22 PROCEDURE — 83036 HEMOGLOBIN GLYCOSYLATED A1C: CPT | Mod: QW,PO | Performed by: INTERNAL MEDICINE

## 2021-06-22 PROCEDURE — G0463 HOSPITAL OUTPT CLINIC VISIT: HCPCS | Mod: PO | Performed by: INTERNAL MEDICINE

## 2021-06-22 PROCEDURE — 99215 OFFICE O/P EST HI 40 MIN: CPT | Performed by: INTERNAL MEDICINE

## 2021-06-22 RX ORDER — ALLOPURINOL 100 MG/1
100 TABLET ORAL 2 TIMES DAILY
COMMUNITY

## 2021-06-22 RX ORDER — BLOOD-GLUCOSE METER
1 KIT MISCELLANEOUS 2 TIMES DAILY
COMMUNITY

## 2021-06-22 RX ORDER — ICOSAPENT ETHYL 1 G/1
2 CAPSULE ORAL 2 TIMES DAILY WITH MEALS
Qty: 360 CAPSULE | Refills: 3 | Status: SHIPPED | OUTPATIENT
Start: 2021-06-22

## 2021-06-22 RX ORDER — LANCETS 28 GAUGE
1 EACH MISCELLANEOUS 2 TIMES DAILY
COMMUNITY

## 2021-06-22 RX ORDER — ROSUVASTATIN CALCIUM 10 MG/1
10 TABLET, COATED ORAL DAILY
Qty: 90 TABLET | Refills: 3 | Status: SHIPPED | OUTPATIENT
Start: 2021-06-22 | End: 2022-06-22

## 2021-06-22 RX ORDER — PHENOL/SODIUM PHENOLATE
20 AEROSOL, SPRAY (ML) MUCOUS MEMBRANE
Qty: 180 TABLET | Refills: 3 | Status: SHIPPED | OUTPATIENT
Start: 2021-06-22

## 2021-06-22 ASSESSMENT — ENCOUNTER SYMPTOMS
VOMITING: 0
CHEST TIGHTNESS: 0
FEVER: 0
BRUISES/BLEEDS EASILY: 0
DECREASED CONCENTRATION: 0
DIZZINESS: 0
ABDOMINAL PAIN: 0
POLYPHAGIA: 0
SORE THROAT: 0
NECK PAIN: 0
NERVOUS/ANXIOUS: 0
SLEEP DISTURBANCE: 0
CHOKING: 0
DYSURIA: 0
FATIGUE: 0
FREQUENCY: 0
MYALGIAS: 0
UNEXPECTED WEIGHT CHANGE: 0
WHEEZING: 0
ARTHRALGIAS: 0
BACK PAIN: 0
EYE PAIN: 0
DIFFICULTY URINATING: 0
TROUBLE SWALLOWING: 0
FLANK PAIN: 0
EYE REDNESS: 0
DIARRHEA: 0
BLOOD IN STOOL: 0
WOUND: 0
CHILLS: 0
HEADACHES: 0
COUGH: 0
APPETITE CHANGE: 0
PALPITATIONS: 0
JOINT SWELLING: 0
HEMATURIA: 0
VOICE CHANGE: 0
POLYDIPSIA: 0
WEAKNESS: 0
DYSPHORIC MOOD: 0
CONSTIPATION: 0
SHORTNESS OF BREATH: 1
ABDOMINAL DISTENTION: 0
NUMBNESS: 0

## 2021-06-22 ASSESSMENT — PAIN SCALES - GENERAL: PAINLEVEL: 0-NO PAIN

## 2021-06-22 NOTE — PATIENT INSTRUCTIONS
A1c 7.1%  1.  Continue Jardiance 10 mg drink water  2.  Continue Januvia 25 mg  3.  Continue Actos 15 mg  4.  Increase Trulicity from 0.75 to 1.5 mg once a week  5.  Stop pravastatin 40 mg, Zetia 10 mg  6.  Start Crestor 10 mg  7.  I ordered Vascepa which is EPA cardiovascular fish oil if it is approved stop the lovaza    DRINK WATER!!  KOBI VALDOVINOS MD

## 2021-06-22 NOTE — PROGRESS NOTES
PROGRESS NOTE     Subjective   Trenton Pulliam is a 75 y.o. male with type 2 diabetes, which he has had for about 20 years. He just returned from [place] - was told he may have MM on right arm bone. His past medical history is also significant for hyperlipidemia, hypertension & CKD stage 3b,  TONG - CPAP oxygen at night. Diabetes complicted by neuropathy. Currently taking Januvia 25mg, Jardiance 10mg daily, trulicity 0.75 mg & Actos 15mg daily for diabetes management. He is adamant about not trying any injectable meds.  A1c 7.1%.     Trenton checks blood sugars 1 times daily.  Patient is checking blood glucose readings once daily.  His average blood sugar reading is 153.  Highest is 180.  Lowest is 135. Denies any hypoglycemia. No chest pain or pressure. No edema in legs. [facility] . [facility].   Labs from  pravachol 40 mg and zetia 10 mg and tricor 145 mg. He is on fish oil.  Her recent blood work total cholesterol 133 triglycerides 209 LDL 61 HDL 38.  BUN/creatinine 27/2.17, GFR 29, calcium 9.5, micrometer creatinine ratio normal, TSH 1.7  BP is 120/69 - early on amlodipine 10 mg, Spironactone 25 mg, valsartan 80 mg, Toprol 100 mg. He is on lasix 40 mg.    Comprehensive 14 point review of systems is otherwise unremarkable except what has been mentioned in the history of present illness .    Currently Taking Medications:  Current Outpatient Medications   Medication Instructions   • allopurinoL (ZYLOPRIM) 100 mg, oral, 2 times daily   • amLODIPine (NORVASC) 10 mg, oral, Daily   • aspirin 81 mg, oral, Daily   • blood sugar diagnostic (FreeStyle Lite Strips) strip 1 strip, subcutaneous, 2 times daily   • diphenhydramine HCl (SIMPLY SLEEP ORAL) oral, Daily   • dulaglutide 1.5 mg, subcutaneous, Weekly   • empagliflozin 10 mg, oral, Daily   • furosemide (LASIX) 20-40 mg, oral, Daily   • gabapentin (NEURONTIN) 300 mg, oral, 2 times daily   •  Lactobacillus rhamnosus GG (CULTURELLE) 15 billion cell capsule, sprinkle oral, Daily   • lancets (freestyle) 28 gauge misc Check blood sugar four times a day or as directed   • lancets (freestyle) 28 gauge misc 1 Device, subcutaneous, 2 times daily   • metoprolol succinate XL (TOPROL-XL) 100 mg, oral, Daily   • multivitamin-minerals-lutein tablet TAKE MENS ONE A DAY MULTIVITAMIN BY MOUTH ONCE EVERY DAY   • omega-3 acid ethyl esters (LOVAZA) 1 gram capsule TAKE 1 CAPSULE TWICE A DAY   • omeprazole (PRILOSEC) 20 mg, oral, Every evening   • psyllium husk (METAMUCIL ORAL) oral, Daily   • SITagliptin (JANUVIA) 25 mg, oral, Daily   • spironolactone (ALDACTONE) 25 mg, oral, 2 times daily   • TRICOR 145 mg tablet 1 tablet, oral, Daily   • umeclidinium-vilanterol (ANORO ELLIPTA) 62.5-25 mcg/actuation blister with device 1 puff, inhalation, Daily   • valsartan (DIOVAN) 80 mg, oral, 2 times daily       Allergies:  Allergies   Allergen Reactions   • Ace Inhibitors      POSSIBLE ANGIOEDEMA   • Azithromycin Anaphylaxis     QUESTIONABLE ALLERGY   • Lisinopril      Other reaction(s): Cough  ANGIOEDEMA   • Nitroglycerin Anaphylaxis     SEIZURES   • Losartan-Hydrochlorothiazide      Other reaction(s): PNEUMONIA, RENAL FAILURE SYNDROME       History Review:  Past Medical History:   Diagnosis Date   • COPD (chronic obstructive pulmonary disease) (CMS/[facility]) ([facility])    • Coronary artery disease    • Diabetes mellitus (CMS/[facility]) ([facility])    • Hyperkalemia 8/23/2019   • Hypertension    • Sleep apnea, obstructive    • Uncontrolled type 2 diabetes mellitus with hyperglycemia (CMS/[facility]) ([facility]) 10/3/2019       Past Surgical History:   Procedure Laterality Date   • COLONOSCOPY  2016    9in large removed    • HERNIA REPAIR  2016       Social History     Socioeconomic History   • Marital status:      Spouse name: Not on file   • Number of children: Not on file   • Years of education: Not on file   • Highest education level: Not on file    Occupational History   • Not on file   Tobacco Use   • Smoking status: Former Smoker     Packs/day: 2.50     Years: 35.00     Pack years: 87.50     Types: Cigarettes     Quit date:      Years since quittin.4   • Smokeless tobacco: Never Used   Substance and Sexual Activity   • Alcohol use: No   • Drug use: No   • Sexual activity: Defer   Other Topics Concern   • Not on file   Social History Narrative   • Not on file     Social Determinants of Health     Financial Resource Strain:    • Difficulty of Paying Living Expenses:    Food Insecurity:    • Worried About Running Out of Food in the Last Year:    • Ran Out of Food in the Last Year:    Transportation Needs:    • Lack of Transportation (Medical):    • Lack of Transportation (Non-Medical):    Physical Activity:    • Days of Exercise per Week:    • Minutes of Exercise per Session:    Stress:    • Feeling of Stress :    Social Connections:    • Frequency of Communication with Friends and Family:    • Frequency of Social Gatherings with Friends and Family:    • Attends [employer] Services:    • Active Member of Clubs or Organizations:    • Attends Club or Organization Meetings:    • Marital Status:    Intimate Partner Violence:    • Fear of Current or Ex-Partner:    • Emotionally Abused:    • Physically Abused:    • Sexually Abused:        Family History   Problem Relation Age of Onset   • Diabetes Mother    • Cancer Mother    • Diabetes Father    • Cancer Brother        Review of Systems   Constitutional: Negative for appetite change, chills, fatigue, fever and unexpected weight change.             HENT: Negative for ear pain, hearing loss, sore throat, trouble swallowing and voice change.    Eyes: Negative for pain, redness and visual disturbance.   Respiratory: Positive for shortness of breath. Negative for cough, choking, chest tightness and wheezing.    Cardiovascular: Negative for chest pain and palpitations.   Gastrointestinal: Negative for abdominal  distention, abdominal pain, blood in stool, constipation, diarrhea and vomiting.   Endocrine: Negative for cold intolerance, heat intolerance, polydipsia, polyphagia and polyuria.   Genitourinary: Negative for difficulty urinating, dysuria, flank pain, frequency, hematuria and urgency.   Musculoskeletal: Negative for arthralgias, back pain, gait problem, joint swelling, myalgias and neck pain.   Skin: Negative for rash and wound.   Neurological: Negative for dizziness, syncope, weakness, numbness and headaches.   Hematological: Does not bruise/bleed easily.   Psychiatric/Behavioral: Negative for behavioral problems, decreased concentration, dysphoric mood and sleep disturbance. The patient is not nervous/anxious.          Objective   /69 (BP Location: Left arm, Patient Position: Sitting)   Pulse 79   Ht 1.829 m (6')   Wt (!) 156.4 kg (344 lb 12.8 oz)   SpO2 91%   BMI 46.76 kg/m²       Physical Exam  GENERAL:  Alert and oriented x3, in no apparent distress. Obese.  HEENT:  Normocephalic, atraumatic.  There is no lid lag or proptosis noted.  Sclerae are not injected.  NECK:  Thyroid is smooth, nontender, not enlarged.  No nodularities appreciated.  No bruits auscultated.  CARDIOVASCULAR:  Regular rate and rhythm.  S1, S2 are heard.  No rubs, gallops or murmurs are appreciated.  RESPIRATORY:  Lungs clear to auscultation bilaterally without rales, rhonchi or wheezing.  EXTREMITIES:  Show no clubbing, cyanosis, edema, or tremor.  NEUROLOGIC:  There is no focal deficit.  Gait is normal. DTR 1+   PSYCHIATRIC:  The patient is alert and oriented.  Mood and affect are appropriate.  SKIN:  Without significant rash or lesion.    Lab Review:  Lab Results   Component Value Date    HGBA1C 7.1 (A) 06/22/2021     Lab Results   Component Value Date    CHOL 142 06/18/2019    CHOL 131 07/12/2018     Lab Results   Component Value Date    HDL 41 06/18/2019    HDL 39 07/12/2018     Lab Results   Component Value Date    LDLCALC  62.8 06/18/2019    LDLCALC 48.6 07/12/2018     Lab Results   Component Value Date    TRIG 191 06/18/2019    TRIG 217 07/12/2018     Lab Results   Component Value Date    MICROALBUR 0.7 07/12/2018     No results found for: TSH  Lab Results   Component Value Date    GLUCOSE 189 08/30/2019    CALCIUM 9.0 08/30/2019     08/30/2019    K 4.2 08/30/2019    CO2 23.00 08/30/2019     08/30/2019    BUN 26 08/30/2019    CREATININE 1.9 08/30/2019    ANIONGAP 11.0 08/25/2016           Assessment/Plan   Trenton was seen today for diabetes type ii.    Diagnoses and all orders for this visit:    Uncontrolled type 2 diabetes mellitus with hyperglycemia (CMS/[facility]) ([facility])  -     POCT Glycosylated Hemoglobin (Hb A1C)  -     dulaglutide 1.5 mg/0.5 mL pen injector; Inject 0.5 mL (1.5 mg total) under the skin once a week    Mixed hyperlipidemia    Essential hypertension    Severe obesity (BMI >= 40) (CMS/[facility]) ([facility])           1.  Type 2 diabetes without complications: The patient is high risk diabetic he is overweight he has chronic kidney disease stage III.  His most recent A1c 7.1%.  He is done very well on his regimen.  Have him continue Jardiance 10 mg, Januvia 25 mg, Actos 15 mg.  We will increase his Trulicity from 0.75 to 1.5 mg.   Patient was encouraged to stay hydrated.  While he is in [place] he will be in contact with me regarding his blood sugar readings.     In regard to complications - he has no retinopathy no nephropathy and no neuropathy.  [facility] The patient does see nephrology and his chronic kidney disease stage IIIb.  We did have a chance to review his labs.  He is on aspirin 81 mg.    2.  Hypertension: controlled. he is on multiple agents. GFR 27 He is on numerous agents including many diuretics.     3.  Mixed hyperlipidemia: His LDL is at goal less than 100.  He is on Pravachol 40 mg, Zetia 10 mg. He is also on fenofibrate and fish oil.  At this juncture we will discontinue  the Pravachol and the Zetia.  We will put him on Crestor 10 mg.  We will also see if the patient can have Vascepa based on the reduce it trial.  If that is approved he will start and stop the fish oil.    On the date of the service a total of 45 minutes were spent in this encounter        A voice recognition program was used to aid in documentation of this record. Sometimes words are not printed exactly as they were spoken.  While efforts were made to carefully edit and correct any inaccuracies, some errors may be present; please take these into context.  Please contact the provider if areas are identified            No follow-ups on file.

## 2021-06-22 NOTE — LETTER
[facility]  [address] [facility] [address] [place] [id number] [phone]  Dept: [phone]  June 22, 2021     [facility] [place]  [address]  [place] [address]    Patient: Trenton Pulliam   YOB: 1945   Date of Visit: 6/22/2021       To:  [facility]    Our mutual patient, Trenton Pulliam, was seen in my office on 6/22/2021. Below are my notes.     KOBI VALDOVINOS MD  6/22/2021  2:47 PM  Sign when Signing Visit  PROGRESS NOTE     Subjective   Trenton Pulliam is a 75 y.o. male with type 2 diabetes, which he has had for about 20 years. He just returned from [place] - was told he may have MM on right arm bone. His past medical history is also significant for hyperlipidemia, hypertension & CKD stage 3b,  TONG - CPAP oxygen at night. Diabetes complicted by neuropathy. Currently taking Januvia 25mg, Jardiance 10mg daily, trulicity 0.75 mg & Actos 15mg daily for diabetes management. He is adamant about not trying any injectable meds.  A1c 7.1%.     Trenton checks blood sugars 1 times daily.  Patient is checking blood glucose readings once daily.  His average blood sugar reading is 153.  Highest is 180.  Lowest is 135. Denies any hypoglycemia. No chest pain or pressure. No edema in legs. [facility]. [facility].   Labs from  pravachol 40 mg and zetia 10 mg and tricor 145 mg. He is on fish oil.  Her recent blood work total cholesterol 133 triglycerides 209 LDL 61 HDL 38.  BUN/creatinine 27/2.17, GFR 29, calcium 9.5, micrometer creatinine ratio normal, TSH 1.7  BP is 120/69 - early on amlodipine 10 mg, Spironactone 25 mg, valsartan 80 mg, Toprol 100 mg. He is on lasix 40 mg.    Comprehensive 14 point review of systems is otherwise unremarkable except what has been mentioned in the history of present illness .    Currently Taking Medications:  Current Outpatient Medications   Medication Instructions   •  allopurinoL (ZYLOPRIM) 100 mg, oral, 2 times daily   • amLODIPine (NORVASC) 10 mg, oral, Daily   • aspirin 81 mg, oral, Daily   • blood sugar diagnostic (FreeStyle Lite Strips) strip 1 strip, subcutaneous, 2 times daily   • diphenhydramine HCl (SIMPLY SLEEP ORAL) oral, Daily   • dulaglutide 1.5 mg, subcutaneous, Weekly   • empagliflozin 10 mg, oral, Daily   • furosemide (LASIX) 20-40 mg, oral, Daily   • gabapentin (NEURONTIN) 300 mg, oral, 2 times daily   • Lactobacillus rhamnosus GG (CULTURELLE) 15 billion cell capsule, sprinkle oral, Daily   • lancets (freestyle) 28 gauge misc Check blood sugar four times a day or as directed   • lancets (freestyle) 28 gauge misc 1 Device, subcutaneous, 2 times daily   • metoprolol succinate XL (TOPROL-XL) 100 mg, oral, Daily   • multivitamin-minerals-lutein tablet TAKE MENS ONE A DAY MULTIVITAMIN BY MOUTH ONCE EVERY DAY   • omega-3 acid ethyl esters (LOVAZA) 1 gram capsule TAKE 1 CAPSULE TWICE A DAY   • omeprazole (PRILOSEC) 20 mg, oral, Every evening   • psyllium husk (METAMUCIL ORAL) oral, Daily   • SITagliptin (JANUVIA) 25 mg, oral, Daily   • spironolactone (ALDACTONE) 25 mg, oral, 2 times daily   • TRICOR 145 mg tablet 1 tablet, oral, Daily   • umeclidinium-vilanterol (ANORO ELLIPTA) 62.5-25 mcg/actuation blister with device 1 puff, inhalation, Daily   • valsartan (DIOVAN) 80 mg, oral, 2 times daily       Allergies:  Allergies   Allergen Reactions   • Ace Inhibitors      POSSIBLE ANGIOEDEMA   • Azithromycin Anaphylaxis     QUESTIONABLE ALLERGY   • Lisinopril      Other reaction(s): Cough  ANGIOEDEMA   • Nitroglycerin Anaphylaxis     SEIZURES   • Losartan-Hydrochlorothiazide      Other reaction(s): PNEUMONIA, RENAL FAILURE SYNDROME       History Review:  Past Medical History:   Diagnosis Date   • COPD (chronic obstructive pulmonary disease) (CMS/HCC) ([facility])    • Coronary artery disease    • Diabetes mellitus (CMS/HCC) ([facility])    • Hyperkalemia 8/23/2019   • Hypertension    •  Sleep apnea, obstructive    • Uncontrolled type 2 diabetes mellitus with hyperglycemia (CMS/HCC) (HCC) 10/3/2019       Past Surgical History:   Procedure Laterality Date   • COLONOSCOPY  2016    9in large removed    • HERNIA REPAIR  2016       Social History     Socioeconomic History   • Marital status:      Spouse name: Not on file   • Number of children: Not on file   • Years of education: Not on file   • Highest education level: Not on file   Occupational History   • Not on file   Tobacco Use   • Smoking status: Former Smoker     Packs/day: 2.50     Years: 35.00     Pack years: 87.50     Types: Cigarettes     Quit date:      Years since quittin.4   • Smokeless tobacco: Never Used   Substance and Sexual Activity   • Alcohol use: No   • Drug use: No   • Sexual activity: Defer   Other Topics Concern   • Not on file   Social History Narrative   • Not on file     Social Determinants of Health     Financial Resource Strain:    • Difficulty of Paying Living Expenses:    Food Insecurity:    • Worried About Running Out of Food in the Last Year:    • Ran Out of Food in the Last Year:    Transportation Needs:    • Lack of Transportation (Medical):    • Lack of Transportation (Non-Medical):    Physical Activity:    • Days of Exercise per Week:    • Minutes of Exercise per Session:    Stress:    • Feeling of Stress :    Social Connections:    • Frequency of Communication with Friends and Family:    • Frequency of Social Gatherings with Friends and Family:    • Attends [employer] Services:    • Active Member of Clubs or Organizations:    • Attends Club or Organization Meetings:    • Marital Status:    Intimate Partner Violence:    • Fear of Current or Ex-Partner:    • Emotionally Abused:    • Physically Abused:    • Sexually Abused:        Family History   Problem Relation Age of Onset   • Diabetes Mother    • Cancer Mother    • Diabetes Father    • Cancer Brother        Review of Systems   Constitutional:  Negative for appetite change, chills, fatigue, fever and unexpected weight change.             HENT: Negative for ear pain, hearing loss, sore throat, trouble swallowing and voice change.    Eyes: Negative for pain, redness and visual disturbance.   Respiratory: Positive for shortness of breath. Negative for cough, choking, chest tightness and wheezing.    Cardiovascular: Negative for chest pain and palpitations.   Gastrointestinal: Negative for abdominal distention, abdominal pain, blood in stool, constipation, diarrhea and vomiting.   Endocrine: Negative for cold intolerance, heat intolerance, polydipsia, polyphagia and polyuria.   Genitourinary: Negative for difficulty urinating, dysuria, flank pain, frequency, hematuria and urgency.   Musculoskeletal: Negative for arthralgias, back pain, gait problem, joint swelling, myalgias and neck pain.   Skin: Negative for rash and wound.   Neurological: Negative for dizziness, syncope, weakness, numbness and headaches.   Hematological: Does not bruise/bleed easily.   Psychiatric/Behavioral: Negative for behavioral problems, decreased concentration, dysphoric mood and sleep disturbance. The patient is not nervous/anxious.          Objective   /69 (BP Location: Left arm, Patient Position: Sitting)   Pulse 79   Ht 1.829 m (6')   Wt (!) 156.4 kg (344 lb 12.8 oz)   SpO2 91%   BMI 46.76 kg/m²       Physical Exam  GENERAL:  Alert and oriented x3, in no apparent distress. Obese.  HEENT:  Normocephalic, atraumatic.  There is no lid lag or proptosis noted.  Sclerae are not injected.  NECK:  Thyroid is smooth, nontender, not enlarged.  No nodularities appreciated.  No bruits auscultated.  CARDIOVASCULAR:  Regular rate and rhythm.  S1, S2 are heard.  No rubs, gallops or murmurs are appreciated.  RESPIRATORY:  Lungs clear to auscultation bilaterally without rales, rhonchi or wheezing.  EXTREMITIES:  Show no clubbing, cyanosis, edema, or tremor.  NEUROLOGIC:  There is no  focal deficit.  Gait is normal. DTR 1+   PSYCHIATRIC:  The patient is alert and oriented.  Mood and affect are appropriate.  SKIN:  Without significant rash or lesion.    Lab Review:  Lab Results   Component Value Date    HGBA1C 7.1 (A) 06/22/2021     Lab Results   Component Value Date    CHOL 142 06/18/2019    CHOL 131 07/12/2018     Lab Results   Component Value Date    HDL 41 06/18/2019    HDL 39 07/12/2018     Lab Results   Component Value Date    LDLCALC 62.8 06/18/2019    LDLCALC 48.6 07/12/2018     Lab Results   Component Value Date    TRIG 191 06/18/2019    TRIG 217 07/12/2018     Lab Results   Component Value Date    MICROALBUR 0.7 07/12/2018     No results found for: TSH  Lab Results   Component Value Date    GLUCOSE 189 08/30/2019    CALCIUM 9.0 08/30/2019     08/30/2019    K 4.2 08/30/2019    CO2 23.00 08/30/2019     08/30/2019    BUN 26 08/30/2019    CREATININE 1.9 08/30/2019    ANIONGAP 11.0 08/25/2016           Assessment/Plan   Trenton was seen today for diabetes type ii.    Diagnoses and all orders for this visit:    Uncontrolled type 2 diabetes mellitus with hyperglycemia (CMS/[facility]) ([facility])  -     POCT Glycosylated Hemoglobin (Hb A1C)  -     dulaglutide 1.5 mg/0.5 mL pen injector; Inject 0.5 mL (1.5 mg total) under the skin once a week    Mixed hyperlipidemia    Essential hypertension    Severe obesity (BMI >= 40) (CMS/[facility]) ([facility])           1.  Type 2 diabetes without complications: The patient is high risk diabetic he is overweight he has chronic kidney disease stage III.  His most recent A1c 7.1%.  He is done very well on his regimen.  Have him continue Jardiance 10 mg, Januvia 25 mg, Actos 15 mg.  We will increase his Trulicity from 0.75 to 1.5 mg.   Patient was encouraged to stay hydrated.  While he is in [place] he will be in contact with me regarding his blood sugar readings.     In regard to complications - he has no retinopathy no nephropathy and no neuropathy.  [facility] The patient does see nephrology and his chronic kidney disease stage IIIb.  We did have a chance to review his labs.  He is on aspirin 81 mg.    2.  Hypertension: controlled. he is on multiple agents. GFR 27 He is on numerous agents including many diuretics.     3.  Mixed hyperlipidemia: His LDL is at goal less than 100.  He is on Pravachol 40 mg, Zetia 10 mg. He is also on fenofibrate and fish oil.  At this juncture we will discontinue the Pravachol and the Zetia.  We will put him on Crestor 10 mg.  We will also see if the patient can have Vascepa based on the reduce it trial.  If that is approved he will start and stop the fish oil.    On the date of the service a total of 45 minutes were spent in this encounter        A voice recognition program was used to aid in documentation of this record. Sometimes words are not printed exactly as they were spoken.  While efforts were made to carefully edit and correct any inaccuracies, some errors may be present; please take these into context.  Please contact the provider if areas are identified            No follow-ups on file.                   If you have questions, please do not hesitate to call me. I look forward to following your patient along with you.         Sincerely,        KOBI VALDOVINOS MD        CC: No Recipients

## 2021-06-23 NOTE — PROGRESS NOTES
Received return VM from [facility] at 1411 with fax number to send report of [phone]. Office note faxed at [phone] on 6/23/21.

## 2022-04-01 ENCOUNTER — OFFICE VISIT (OUTPATIENT)
Dept: URBAN - METROPOLITAN AREA CLINIC 85 | Facility: CLINIC | Age: 77
End: 2022-04-01
Payer: MEDICARE

## 2022-04-01 DIAGNOSIS — E11.9 TYPE 2 DIABETES MELLITUS WITHOUT COMPLICATIONS: Primary | ICD-10-CM

## 2022-04-01 DIAGNOSIS — H25.13 AGE-RELATED NUCLEAR CATARACT, BILATERAL: ICD-10-CM

## 2022-04-01 PROCEDURE — 92014 COMPRE OPH EXAM EST PT 1/>: CPT | Performed by: OPHTHALMOLOGY

## 2022-04-01 ASSESSMENT — VISUAL ACUITY
OS: 20/20
OD: 20/20

## 2022-04-01 ASSESSMENT — INTRAOCULAR PRESSURE
OS: 15
OD: 15

## 2022-04-01 NOTE — IMPRESSION/PLAN
Impression: Type 2 diabetes mellitus without complications: X64.1. Plan: No diabetic retinopathy. Recommend yearly diabetic eye exam. Discussed with patient importance of good blood sugar control with regular visits with PCP, compliance with medications, healthy diet and daily exercise.

## 2022-04-01 NOTE — IMPRESSION/PLAN
Impression: Age-related nuclear cataract, bilateral: H25.13. Plan: Discussed findings. Discussed option of CE/IOL OU. Patient understands cataract effect on vision. Patient defers to have  CE w/IOL consult with  Dr. Shirley Zamora at this time. Continue to monitor. RTC 1 year CEE or ASAP if decreased VA or pain.

## 2022-05-14 DIAGNOSIS — E11.65 UNCONTROLLED TYPE 2 DIABETES MELLITUS WITH HYPERGLYCEMIA (CMS/HCC): ICD-10-CM

## 2022-05-16 RX ORDER — ROSUVASTATIN CALCIUM 10 MG/1
TABLET, COATED ORAL
Qty: 90 TABLET | Refills: 3 | OUTPATIENT
Start: 2022-05-16

## 2022-05-16 RX ORDER — ICOSAPENT ETHYL 1000 MG/1
CAPSULE ORAL
Qty: 360 CAPSULE | Refills: 3 | OUTPATIENT
Start: 2022-05-16

## 2023-04-10 ENCOUNTER — OFFICE VISIT (OUTPATIENT)
Dept: URBAN - METROPOLITAN AREA CLINIC 85 | Facility: CLINIC | Age: 78
End: 2023-04-10
Payer: MEDICARE

## 2023-04-10 DIAGNOSIS — H43.811 VITREOUS DEGENERATION, RIGHT EYE: ICD-10-CM

## 2023-04-10 DIAGNOSIS — E11.9 TYPE 2 DIABETES MELLITUS WITHOUT COMPLICATIONS: Primary | ICD-10-CM

## 2023-04-10 DIAGNOSIS — H43.393 OTHER VITREOUS OPACITIES, BILATERAL: ICD-10-CM

## 2023-04-10 DIAGNOSIS — Z79.4 LONG TERM (CURRENT) USE OF INSULIN: ICD-10-CM

## 2023-04-10 DIAGNOSIS — H25.13 AGE-RELATED NUCLEAR CATARACT, BILATERAL: ICD-10-CM

## 2023-04-10 PROCEDURE — 92014 COMPRE OPH EXAM EST PT 1/>: CPT | Performed by: OPHTHALMOLOGY

## 2023-04-10 PROCEDURE — 92201 OPSCPY EXTND RTA DRAW UNI/BI: CPT | Performed by: OPHTHALMOLOGY

## 2023-04-10 ASSESSMENT — VISUAL ACUITY
OS: 20/20
OD: 20/25

## 2023-04-10 ASSESSMENT — INTRAOCULAR PRESSURE
OD: 16
OS: 15

## 2023-04-10 ASSESSMENT — KERATOMETRY
OD: 43.13
OS: 43.50

## 2023-04-10 NOTE — IMPRESSION/PLAN
Impression: Other vitreous opacities, bilateral: H43.393. Plan:  Reviewed with patient RD precautions, Patient will contact the office immediately if any changes noted in vision including flashes, increased floaters , veil in vision, or vision loss.

## 2023-04-10 NOTE — IMPRESSION/PLAN
Impression: Age-related nuclear cataract, bilateral Plan: Discussed findings. Discussed option of CE/IOL OU. Patient understands cataract effect on vision. Patient defers to have  CE w/IOL consult with  Dr. Ana M Bowen at this time. Continue to monitor. RTC 1 year CEE or ASAP if decreased VA or pain.

## 2023-04-10 NOTE — IMPRESSION/PLAN
Impression: Type 2 diabetes mellitus without complications: S61.1. Plan: No diabetic retinopathy. Recommend yearly diabetic eye exam. Discussed with patient importance of good blood sugar control with regular visits with PCP, compliance with medications, healthy diet and daily exercise.

## 2023-04-10 NOTE — IMPRESSION/PLAN
Impression: Vitreous degeneration, right eye: H43.811. Plan: Scleral depression performed today. PVD without retinal pathology. Warning signs of retinal tear (RT) and retinal detachment (RD) discussed with patient. Pt. instructed to contact our office ASAP if loss of vision, any worsening of symptoms, or changes consistent with RT or RD. RTC in 4-6 weeks for PVD follow up OD or  ASAP if there should be any decrease in vision, pain, or any worsening of condition.

## 2023-05-22 ENCOUNTER — OFFICE VISIT (OUTPATIENT)
Dept: URBAN - METROPOLITAN AREA CLINIC 85 | Facility: CLINIC | Age: 78
End: 2023-05-22
Payer: MEDICARE

## 2023-05-22 DIAGNOSIS — H43.811 VITREOUS DEGENERATION, RIGHT EYE: Primary | ICD-10-CM

## 2023-05-22 PROCEDURE — 92201 OPSCPY EXTND RTA DRAW UNI/BI: CPT | Performed by: OPHTHALMOLOGY

## 2023-05-22 PROCEDURE — 92014 COMPRE OPH EXAM EST PT 1/>: CPT | Performed by: OPHTHALMOLOGY

## 2023-05-22 ASSESSMENT — INTRAOCULAR PRESSURE
OD: 16
OS: 15

## 2023-05-22 NOTE — IMPRESSION/PLAN
Impression: Vitreous degeneration, right eye: H43.811. Plan: Scleral depression performed today. PVD without retinal pathology. Warning signs of retinal tear (RT) and retinal detachment (RD) discussed with patient. Pt. instructed to contact our office ASAP if loss of vision, any worsening of symptoms, or changes consistent with RT or RD. RTC in 2 months for PVD follow up OD or  ASAP if there should be any decrease in vision, pain, or any worsening of condition.

## 2023-07-31 ENCOUNTER — OFFICE VISIT (OUTPATIENT)
Dept: URBAN - METROPOLITAN AREA CLINIC 85 | Facility: CLINIC | Age: 78
End: 2023-07-31
Payer: MEDICARE

## 2023-07-31 DIAGNOSIS — H43.811 VITREOUS DEGENERATION, RIGHT EYE: Primary | ICD-10-CM

## 2023-07-31 DIAGNOSIS — H25.13 AGE-RELATED NUCLEAR CATARACT, BILATERAL: ICD-10-CM

## 2023-07-31 DIAGNOSIS — H43.392 OTHER VITREOUS OPACITIES, LEFT EYE: ICD-10-CM

## 2023-07-31 PROCEDURE — 99204 OFFICE O/P NEW MOD 45 MIN: CPT | Performed by: OPTOMETRIST

## 2023-07-31 ASSESSMENT — INTRAOCULAR PRESSURE
OD: 16
OS: 15

## 2024-01-30 ENCOUNTER — OFFICE VISIT (OUTPATIENT)
Dept: URBAN - METROPOLITAN AREA CLINIC 85 | Facility: CLINIC | Age: 79
End: 2024-01-30
Payer: MEDICARE

## 2024-01-30 DIAGNOSIS — H43.392 OTHER VITREOUS OPACITIES, LEFT EYE: ICD-10-CM

## 2024-01-30 DIAGNOSIS — H43.811 VITREOUS DEGENERATION, RIGHT EYE: ICD-10-CM

## 2024-01-30 DIAGNOSIS — H25.13 AGE-RELATED NUCLEAR CATARACT, BILATERAL: ICD-10-CM

## 2024-01-30 DIAGNOSIS — Z79.84 LONG TERM (CURRENT) USE OF ORAL ANTIDIABETIC DRUGS: ICD-10-CM

## 2024-01-30 DIAGNOSIS — E11.9 TYPE 2 DIABETES MELLITUS WITHOUT COMPLICATIONS: Primary | ICD-10-CM

## 2024-01-30 PROCEDURE — 92134 CPTRZ OPH DX IMG PST SGM RTA: CPT | Performed by: OPTOMETRIST

## 2024-01-30 PROCEDURE — 92014 COMPRE OPH EXAM EST PT 1/>: CPT | Performed by: OPTOMETRIST

## 2024-01-30 ASSESSMENT — VISUAL ACUITY
OD: 20/25
OS: 20/20

## 2024-01-30 ASSESSMENT — INTRAOCULAR PRESSURE
OS: 15
OD: 16

## 2024-01-30 ASSESSMENT — KERATOMETRY
OS: 43.63
OD: 43.38

## 2024-03-12 ENCOUNTER — OFFICE VISIT (OUTPATIENT)
Dept: URBAN - METROPOLITAN AREA CLINIC 85 | Facility: CLINIC | Age: 79
End: 2024-03-12
Payer: MEDICARE

## 2024-03-12 DIAGNOSIS — H43.811 VITREOUS DEGENERATION, RIGHT EYE: Primary | ICD-10-CM

## 2024-03-12 PROCEDURE — 92134 CPTRZ OPH DX IMG PST SGM RTA: CPT | Performed by: OPHTHALMOLOGY

## 2024-03-12 PROCEDURE — 99214 OFFICE O/P EST MOD 30 MIN: CPT | Performed by: OPHTHALMOLOGY

## 2024-03-12 ASSESSMENT — INTRAOCULAR PRESSURE
OS: 13
OD: 15

## 2024-04-18 ENCOUNTER — OFFICE VISIT (OUTPATIENT)
Dept: URBAN - METROPOLITAN AREA CLINIC 85 | Facility: CLINIC | Age: 79
End: 2024-04-18
Payer: MEDICARE

## 2024-04-18 DIAGNOSIS — Z79.84 LONG TERM (CURRENT) USE OF ORAL ANTIDIABETIC DRUGS: ICD-10-CM

## 2024-04-18 DIAGNOSIS — H25.043 POSTERIOR SUBCAPSULAR POLAR AGE-RELATED CATARACT, BILATERAL: ICD-10-CM

## 2024-04-18 DIAGNOSIS — H43.811 VITREOUS DEGENERATION, RIGHT EYE: ICD-10-CM

## 2024-04-18 DIAGNOSIS — E11.9 TYPE 2 DIABETES MELLITUS WITHOUT COMPLICATIONS: ICD-10-CM

## 2024-04-18 DIAGNOSIS — H25.813 COMBINED FORMS OF AGE-RELATED CATARACT, BILATERAL: Primary | ICD-10-CM

## 2024-04-18 PROCEDURE — 92025 CPTRIZED CORNEAL TOPOGRAPHY: CPT | Performed by: OPHTHALMOLOGY

## 2024-04-18 PROCEDURE — 99214 OFFICE O/P EST MOD 30 MIN: CPT | Performed by: OPHTHALMOLOGY

## 2024-04-18 PROCEDURE — 92136 OPHTHALMIC BIOMETRY: CPT | Performed by: OPHTHALMOLOGY

## 2024-04-18 PROCEDURE — 92134 CPTRZ OPH DX IMG PST SGM RTA: CPT | Performed by: OPHTHALMOLOGY

## 2024-04-18 RX ORDER — OFLOXACIN 3 MG/ML
0.3 % SOLUTION/ DROPS OPHTHALMIC
Qty: 1 | Refills: 1 | Status: ACTIVE
Start: 2024-04-18

## 2024-04-18 RX ORDER — KETOROLAC TROMETHAMINE 5 MG/ML
0.5 % SOLUTION OPHTHALMIC
Qty: 1 | Refills: 1 | Status: ACTIVE
Start: 2024-04-18

## 2024-04-18 RX ORDER — PREDNISOLONE ACETATE 10 MG/ML
1 % SUSPENSION/ DROPS OPHTHALMIC
Qty: 1 | Refills: 1 | Status: ACTIVE
Start: 2024-04-18

## 2024-04-18 ASSESSMENT — INTRAOCULAR PRESSURE
OS: 14
OD: 15

## 2024-05-20 ENCOUNTER — ADULT PHYSICAL (OUTPATIENT)
Dept: URBAN - METROPOLITAN AREA CLINIC 85 | Facility: CLINIC | Age: 79
End: 2024-05-20
Payer: MEDICARE

## 2024-05-20 DIAGNOSIS — Z01.818 ENCOUNTER FOR OTHER PREPROCEDURAL EXAMINATION: Primary | ICD-10-CM

## 2024-05-20 DIAGNOSIS — H25.813 COMBINED FORMS OF AGE-RELATED CATARACT, BILATERAL: ICD-10-CM

## 2024-05-20 PROCEDURE — 99203 OFFICE O/P NEW LOW 30 MIN: CPT | Performed by: PHYSICIAN ASSISTANT

## 2024-05-20 RX ORDER — DULAGLUTIDE 4.5 MG/.5ML
INJECTION, SOLUTION SUBCUTANEOUS
Qty: 0 | Refills: 0 | Status: ACTIVE
Start: 2024-05-20

## 2024-05-20 RX ORDER — SODIUM BICARBONATE TAB 325 MG 325 MG
325 MG TAB ORAL
Qty: 0 | Refills: 0 | Status: ACTIVE
Start: 2024-05-20

## 2024-06-04 ENCOUNTER — SURGERY (OUTPATIENT)
Dept: URBAN - METROPOLITAN AREA SURGERY 55 | Facility: SURGERY | Age: 79
End: 2024-06-04
Payer: MEDICARE

## 2024-06-04 PROCEDURE — 66984 XCAPSL CTRC RMVL W/O ECP: CPT | Performed by: OPHTHALMOLOGY

## 2024-06-04 PROCEDURE — PR1CP PR1CP: CUSTOM | Performed by: OPHTHALMOLOGY

## 2024-06-05 ENCOUNTER — POST-OPERATIVE VISIT (OUTPATIENT)
Dept: URBAN - METROPOLITAN AREA CLINIC 85 | Facility: CLINIC | Age: 79
End: 2024-06-05
Payer: MEDICARE

## 2024-06-05 DIAGNOSIS — Z48.810 ENCOUNTER FOR SURGICAL AFTERCARE FOLLOWING SURGERY ON A SENSE ORGAN: Primary | ICD-10-CM

## 2024-06-05 PROCEDURE — 99024 POSTOP FOLLOW-UP VISIT: CPT | Performed by: OPHTHALMOLOGY

## 2024-06-05 ASSESSMENT — INTRAOCULAR PRESSURE: OD: 14

## 2024-06-12 ENCOUNTER — POST-OPERATIVE VISIT (OUTPATIENT)
Dept: URBAN - METROPOLITAN AREA CLINIC 85 | Facility: CLINIC | Age: 79
End: 2024-06-12
Payer: MEDICARE

## 2024-06-12 DIAGNOSIS — Z48.810 ENCOUNTER FOR SURGICAL AFTERCARE FOLLOWING SURGERY ON A SENSE ORGAN: Primary | ICD-10-CM

## 2024-06-12 PROCEDURE — 99024 POSTOP FOLLOW-UP VISIT: CPT | Performed by: OPTOMETRIST

## 2024-06-12 RX ORDER — KETOROLAC TROMETHAMINE 5 MG/ML
0.5 % SOLUTION OPHTHALMIC
Qty: 1 | Refills: 1 | Status: ACTIVE
Start: 2024-06-12

## 2024-06-12 ASSESSMENT — INTRAOCULAR PRESSURE
OD: 15
OS: 15

## 2024-06-12 ASSESSMENT — VISUAL ACUITY: OD: 20/20

## 2024-06-18 ENCOUNTER — SURGERY (OUTPATIENT)
Dept: URBAN - METROPOLITAN AREA SURGERY 55 | Facility: SURGERY | Age: 79
End: 2024-06-18
Payer: MEDICARE

## 2024-06-18 DIAGNOSIS — H25.812 COMBINED FORMS OF AGE-RELATED CATARACT, LEFT EYE: Primary | ICD-10-CM

## 2024-06-18 PROCEDURE — PR1CP PR1CP: CUSTOM | Performed by: OPHTHALMOLOGY

## 2024-06-18 PROCEDURE — 66984 XCAPSL CTRC RMVL W/O ECP: CPT | Performed by: OPHTHALMOLOGY

## 2024-06-19 ENCOUNTER — POST-OPERATIVE VISIT (OUTPATIENT)
Dept: URBAN - METROPOLITAN AREA CLINIC 85 | Facility: CLINIC | Age: 79
End: 2024-06-19
Payer: MEDICARE

## 2024-06-19 DIAGNOSIS — Z96.1 PRESENCE OF INTRAOCULAR LENS: Primary | ICD-10-CM

## 2024-06-19 PROCEDURE — 99024 POSTOP FOLLOW-UP VISIT: CPT | Performed by: OPHTHALMOLOGY

## 2024-06-19 ASSESSMENT — INTRAOCULAR PRESSURE: OS: 16

## 2024-07-10 ENCOUNTER — POST-OPERATIVE VISIT (OUTPATIENT)
Dept: URBAN - METROPOLITAN AREA CLINIC 85 | Facility: CLINIC | Age: 79
End: 2024-07-10
Payer: MEDICARE

## 2024-07-10 DIAGNOSIS — Z96.1 PRESENCE OF INTRAOCULAR LENS: Primary | ICD-10-CM

## 2024-07-10 DIAGNOSIS — H52.4 PRESBYOPIA: ICD-10-CM

## 2024-07-10 PROCEDURE — 99024 POSTOP FOLLOW-UP VISIT: CPT | Performed by: OPTOMETRIST

## 2024-07-10 ASSESSMENT — VISUAL ACUITY
OD: 20/25
OS: 20/20

## 2024-07-10 ASSESSMENT — INTRAOCULAR PRESSURE
OD: 15
OS: 14

## 2025-01-20 ENCOUNTER — OFFICE VISIT (OUTPATIENT)
Dept: URBAN - METROPOLITAN AREA CLINIC 85 | Facility: CLINIC | Age: 80
End: 2025-01-20
Payer: MEDICARE

## 2025-01-20 DIAGNOSIS — E11.9 TYPE 2 DIABETES MELLITUS WITHOUT COMPLICATIONS: Primary | ICD-10-CM

## 2025-01-20 DIAGNOSIS — H43.811 VITREOUS DEGENERATION, RIGHT EYE: ICD-10-CM

## 2025-01-20 DIAGNOSIS — H43.392 OTHER VITREOUS OPACITIES, LEFT EYE: ICD-10-CM

## 2025-01-20 DIAGNOSIS — Z79.84 LONG TERM (CURRENT) USE OF ORAL ANTIDIABETIC DRUGS: ICD-10-CM

## 2025-01-20 DIAGNOSIS — H04.123 TEAR FILM INSUFFICIENCY OF BILATERAL LACRIMAL GLANDS: ICD-10-CM

## 2025-01-20 PROCEDURE — 92014 COMPRE OPH EXAM EST PT 1/>: CPT | Performed by: OPTOMETRIST

## 2025-01-20 ASSESSMENT — INTRAOCULAR PRESSURE
OS: 15
OD: 16

## 2025-01-20 ASSESSMENT — VISUAL ACUITY
OS: 20/20
OD: 20/25